# Patient Record
Sex: FEMALE | Race: WHITE | Employment: OTHER | URBAN - METROPOLITAN AREA
[De-identification: names, ages, dates, MRNs, and addresses within clinical notes are randomized per-mention and may not be internally consistent; named-entity substitution may affect disease eponyms.]

---

## 2017-01-10 RX ORDER — SIMVASTATIN 10 MG
TABLET ORAL
Qty: 90 TABLET | Refills: 0 | Status: SHIPPED | OUTPATIENT
Start: 2017-01-10 | End: 2017-04-14

## 2017-02-20 ENCOUNTER — OFFICE VISIT (OUTPATIENT)
Dept: FAMILY MEDICINE CLINIC | Facility: CLINIC | Age: 57
End: 2017-02-20

## 2017-02-20 VITALS
TEMPERATURE: 98 F | RESPIRATION RATE: 17 BRPM | HEART RATE: 56 BPM | DIASTOLIC BLOOD PRESSURE: 69 MMHG | WEIGHT: 113.38 LBS | HEIGHT: 63.39 IN | BODY MASS INDEX: 19.84 KG/M2 | SYSTOLIC BLOOD PRESSURE: 105 MMHG

## 2017-02-20 DIAGNOSIS — Z00.00 ROUTINE PHYSICAL EXAMINATION: Primary | ICD-10-CM

## 2017-02-20 DIAGNOSIS — M85.80 OSTEOPENIA, UNSPECIFIED LOCATION: ICD-10-CM

## 2017-02-20 DIAGNOSIS — E78.5 HYPERLIPIDEMIA, UNSPECIFIED HYPERLIPIDEMIA TYPE: ICD-10-CM

## 2017-02-20 DIAGNOSIS — Z12.31 ENCOUNTER FOR SCREENING MAMMOGRAM FOR BREAST CANCER: ICD-10-CM

## 2017-02-20 PROCEDURE — 99396 PREV VISIT EST AGE 40-64: CPT | Performed by: FAMILY MEDICINE

## 2017-02-20 NOTE — PROGRESS NOTES
HPI:    Patient ID: Sergey Bryant is a 60year old female. HPI    Review of Systems   Constitutional: Negative. Respiratory: Negative. Cardiovascular: Negative. Gastrointestinal: Negative. Skin: Negative. Neurological: Negative. requested or ordered in this encounter    Imaging & Referrals:  None       #[id number]

## 2017-02-21 ENCOUNTER — APPOINTMENT (OUTPATIENT)
Dept: LAB | Age: 57
End: 2017-02-21
Attending: FAMILY MEDICINE
Payer: COMMERCIAL

## 2017-02-21 DIAGNOSIS — Z00.00 ROUTINE PHYSICAL EXAMINATION: ICD-10-CM

## 2017-02-21 DIAGNOSIS — M85.80 OSTEOPENIA, UNSPECIFIED LOCATION: ICD-10-CM

## 2017-02-21 DIAGNOSIS — E78.5 HYPERLIPIDEMIA, UNSPECIFIED HYPERLIPIDEMIA TYPE: ICD-10-CM

## 2017-02-21 LAB
ALBUMIN SERPL BCP-MCNC: 4.3 G/DL (ref 3.5–4.8)
ALBUMIN/GLOB SERPL: 1.8 {RATIO} (ref 1–2)
ALP SERPL-CCNC: 43 U/L (ref 32–100)
ALT SERPL-CCNC: 49 U/L (ref 14–54)
ANION GAP SERPL CALC-SCNC: 7 MMOL/L (ref 0–18)
AST SERPL-CCNC: 30 U/L (ref 15–41)
BILIRUB SERPL-MCNC: 0.8 MG/DL (ref 0.3–1.2)
BUN SERPL-MCNC: 11 MG/DL (ref 8–20)
BUN/CREAT SERPL: 18.6 (ref 10–20)
CALCIUM SERPL-MCNC: 9.5 MG/DL (ref 8.5–10.5)
CHLORIDE SERPL-SCNC: 105 MMOL/L (ref 95–110)
CHOLEST SERPL-MCNC: 162 MG/DL (ref 110–200)
CO2 SERPL-SCNC: 28 MMOL/L (ref 22–32)
CREAT SERPL-MCNC: 0.59 MG/DL (ref 0.5–1.5)
GLOBULIN PLAS-MCNC: 2.4 G/DL (ref 2.5–3.7)
GLUCOSE SERPL-MCNC: 84 MG/DL (ref 70–99)
HDLC SERPL-MCNC: 54 MG/DL
LDLC SERPL CALC-MCNC: 83 MG/DL (ref 0–99)
NONHDLC SERPL-MCNC: 108 MG/DL
OSMOLALITY UR CALC.SUM OF ELEC: 289 MOSM/KG (ref 275–295)
POTASSIUM SERPL-SCNC: 4.1 MMOL/L (ref 3.3–5.1)
PROT SERPL-MCNC: 6.7 G/DL (ref 5.9–8.4)
SODIUM SERPL-SCNC: 140 MMOL/L (ref 136–144)
TRIGL SERPL-MCNC: 123 MG/DL (ref 1–149)

## 2017-02-21 PROCEDURE — 82306 VITAMIN D 25 HYDROXY: CPT

## 2017-02-21 PROCEDURE — 80061 LIPID PANEL: CPT

## 2017-02-21 PROCEDURE — 80053 COMPREHEN METABOLIC PANEL: CPT

## 2017-02-22 LAB — 25(OH)D3 SERPL-MCNC: 42.6 NG/ML

## 2017-03-30 ENCOUNTER — OFFICE VISIT (OUTPATIENT)
Dept: OBGYN CLINIC | Facility: CLINIC | Age: 57
End: 2017-03-30

## 2017-03-30 VITALS
DIASTOLIC BLOOD PRESSURE: 64 MMHG | HEIGHT: 63 IN | WEIGHT: 114 LBS | SYSTOLIC BLOOD PRESSURE: 116 MMHG | BODY MASS INDEX: 20.2 KG/M2

## 2017-03-30 DIAGNOSIS — Z01.419 ENCOUNTER FOR GYNECOLOGICAL EXAMINATION WITHOUT ABNORMAL FINDING: Primary | ICD-10-CM

## 2017-03-30 PROCEDURE — 87624 HPV HI-RISK TYP POOLED RSLT: CPT | Performed by: OBSTETRICS & GYNECOLOGY

## 2017-03-30 PROCEDURE — 99396 PREV VISIT EST AGE 40-64: CPT | Performed by: OBSTETRICS & GYNECOLOGY

## 2017-03-30 PROCEDURE — 88175 CYTOPATH C/V AUTO FLUID REDO: CPT | Performed by: OBSTETRICS & GYNECOLOGY

## 2017-03-30 NOTE — PROGRESS NOTES
Lillian Vazquez is here for a checkup. I have not seen her since 2014. She has no complaints she has not had any vaginal bleeding or spotting at all. She is up-to-date with her mammograms and colonoscopy.     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  - ThinPrep PAP Smear  - Hpv Dna  High Risk , Osmin Corbin MD  1:44 PM  3/30/2017

## 2017-03-31 LAB — HPV I/H RISK 1 DNA SPEC QL NAA+PROBE: NEGATIVE

## 2017-04-14 RX ORDER — SIMVASTATIN 10 MG
TABLET ORAL
Qty: 90 TABLET | Refills: 0 | Status: SHIPPED | OUTPATIENT
Start: 2017-04-14 | End: 2017-07-19

## 2017-04-14 NOTE — TELEPHONE ENCOUNTER
Rx request for Simvastatin 10 mg, PASSED Cholesterol Protocol. Rx filled per protocol.     Cholesterol Medications  Protocol Criteria:  · Appointment scheduled in the past 12 months or in the next 3 months  · ALT & LDL on file in the past 12 months  · ALT r

## 2017-05-08 ENCOUNTER — HOSPITAL ENCOUNTER (OUTPATIENT)
Dept: MAMMOGRAPHY | Facility: HOSPITAL | Age: 57
Discharge: HOME | End: 2017-05-08
Attending: FAMILY MEDICINE
Payer: COMMERCIAL

## 2017-05-08 DIAGNOSIS — Z12.31 ENCOUNTER FOR SCREENING MAMMOGRAM FOR BREAST CANCER: ICD-10-CM

## 2017-05-08 PROCEDURE — 77067 SCR MAMMO BI INCL CAD: CPT | Performed by: FAMILY MEDICINE

## 2017-07-19 ENCOUNTER — TELEPHONE (OUTPATIENT)
Dept: OBGYN CLINIC | Facility: CLINIC | Age: 57
End: 2017-07-19

## 2017-07-19 ENCOUNTER — OFFICE VISIT (OUTPATIENT)
Dept: OBGYN CLINIC | Facility: CLINIC | Age: 57
End: 2017-07-19

## 2017-07-19 VITALS — SYSTOLIC BLOOD PRESSURE: 122 MMHG | DIASTOLIC BLOOD PRESSURE: 74 MMHG

## 2017-07-19 DIAGNOSIS — N76.0 VAGINITIS AND VULVOVAGINITIS: Primary | ICD-10-CM

## 2017-07-19 PROCEDURE — 87808 TRICHOMONAS ASSAY W/OPTIC: CPT | Performed by: OBSTETRICS & GYNECOLOGY

## 2017-07-19 PROCEDURE — 99213 OFFICE O/P EST LOW 20 MIN: CPT | Performed by: OBSTETRICS & GYNECOLOGY

## 2017-07-19 PROCEDURE — 87205 SMEAR GRAM STAIN: CPT | Performed by: OBSTETRICS & GYNECOLOGY

## 2017-07-19 PROCEDURE — 87106 FUNGI IDENTIFICATION YEAST: CPT | Performed by: OBSTETRICS & GYNECOLOGY

## 2017-07-19 RX ORDER — METRONIDAZOLE 7.5 MG/G
GEL VAGINAL 2 TIMES DAILY
Status: SHIPPED | OUTPATIENT
Start: 2017-07-19 | End: 2017-07-24

## 2017-07-19 RX ORDER — SIMVASTATIN 10 MG
TABLET ORAL
Qty: 90 TABLET | Refills: 3 | Status: SHIPPED | OUTPATIENT
Start: 2017-07-19 | End: 2018-07-16

## 2017-07-19 NOTE — PROGRESS NOTES
Arturo here for a checkup. She is complaining of vaginal irritation and vaginal discharge. Patient was swimming in the leg for past couple weeks. She denies any vaginal spotting or bleeding. She denies any abdominal pain, fever, chills.     On examinatio

## 2017-07-19 NOTE — TELEPHONE ENCOUNTER
Pt at pharmacy, prescription not sent, per pharmacist. Prescription for Metrogel called in to [facility].

## 2017-07-21 LAB
CANDIDA SCREEN: NEGATIVE
GENITAL VAGINOSIS SCREEN: NEGATIVE
TRICHOMONAS SCREEN: NEGATIVE

## 2017-11-07 ENCOUNTER — TELEPHONE (OUTPATIENT)
Dept: OBGYN CLINIC | Facility: CLINIC | Age: 57
End: 2017-11-07

## 2017-11-07 RX ORDER — METRONIDAZOLE 7.5 MG/G
GEL VAGINAL
Qty: 1 TUBE | Refills: 0 | Status: SHIPPED | OUTPATIENT
Start: 2017-11-07 | End: 2017-11-21 | Stop reason: ALTCHOICE

## 2017-11-07 NOTE — TELEPHONE ENCOUNTER
Pt states she has BV. She she was treated back in July by Dr. Lillian Triana and is currently out of town and wont be back in town till November 18 th can something be called in for her. pharmacy # [phone] Kacey Archuleta)

## 2017-11-07 NOTE — TELEPHONE ENCOUNTER
Spoke to pt who stated that she is having sx of vaginosis. She has a yellow d/c with odor. She saw Dr. Servando Becerra in July for the same thing. Per Dr. Servando Becerra, ok to give pt Metrogel bid x 5 days. Sent script to [facility] in [place]. Let pt know.

## 2017-11-21 ENCOUNTER — OFFICE VISIT (OUTPATIENT)
Dept: FAMILY MEDICINE CLINIC | Facility: CLINIC | Age: 57
End: 2017-11-21

## 2017-11-21 VITALS
HEART RATE: 118 BPM | SYSTOLIC BLOOD PRESSURE: 109 MMHG | BODY MASS INDEX: 20.2 KG/M2 | TEMPERATURE: 98 F | HEIGHT: 63 IN | WEIGHT: 114 LBS | DIASTOLIC BLOOD PRESSURE: 70 MMHG

## 2017-11-21 DIAGNOSIS — Z48.02 ENCOUNTER FOR REMOVAL OF SUTURES: Primary | ICD-10-CM

## 2017-11-21 PROCEDURE — 99212 OFFICE O/P EST SF 10 MIN: CPT | Performed by: PHYSICIAN ASSISTANT

## 2017-11-21 NOTE — PROGRESS NOTES
HPI:    Patient ID: Stewart Milner is a 60year old female. Patient presents for removal of 3 simple sutures placed to bottom of left foot at immediate care in [place] one week ago.   She states some time before that she may have sustained small piece of gla very well. Patient advised to follow up if she has any persisting pain in area or develops redness or swelling. No orders of the defined types were placed in this encounter.       Meds This Visit:  No prescriptions requested or ordered in this encount

## 2017-12-22 ENCOUNTER — HOSPITAL ENCOUNTER (OUTPATIENT)
Age: 57
Discharge: HOME | End: 2017-12-22
Attending: FAMILY MEDICINE
Payer: COMMERCIAL

## 2017-12-22 ENCOUNTER — TELEPHONE (OUTPATIENT)
Dept: FAMILY MEDICINE CLINIC | Facility: CLINIC | Age: 57
End: 2017-12-22

## 2017-12-22 VITALS
RESPIRATION RATE: 20 BRPM | HEART RATE: 66 BPM | SYSTOLIC BLOOD PRESSURE: 132 MMHG | OXYGEN SATURATION: 99 % | DIASTOLIC BLOOD PRESSURE: 65 MMHG | TEMPERATURE: 98 F

## 2017-12-22 DIAGNOSIS — L24.9 IRRITANT CONTACT DERMATITIS, UNSPECIFIED TRIGGER: Primary | ICD-10-CM

## 2017-12-22 PROCEDURE — 99202 OFFICE O/P NEW SF 15 MIN: CPT

## 2017-12-22 PROCEDURE — 99212 OFFICE O/P EST SF 10 MIN: CPT

## 2017-12-22 RX ORDER — DIAPER,BRIEF,INFANT-TODD,DISP
EACH MISCELLANEOUS
Qty: 14 G | Refills: 0 | Status: SHIPPED | OUTPATIENT
Start: 2017-12-22 | End: 2018-01-23

## 2017-12-22 NOTE — TELEPHONE ENCOUNTER
Patient requesting to speak with Dr. Luly Mixon due to thinks has possible shingles and daugher 9 months pregnant so does not want to give it to her. Patient states has rash, with  No pain at this time but has been waking daily with headache.

## 2017-12-22 NOTE — ED INITIAL ASSESSMENT (HPI)
Pt here with a rash adryan to the right side of her chest that she noticed about a week, pt denies any pain to the area , pt complaints of a headache that has been going on for about a week, pt denies any fevers

## 2017-12-22 NOTE — TELEPHONE ENCOUNTER
Dr Jeovany Ann is not in the office, please have her go to urgent care or send this triage for further management

## 2017-12-22 NOTE — ED NOTES
Pt discharged home pt instructed to take hydrocortisone over the counter , pt instructed to follow up with primary md if symptoms worsen

## 2017-12-22 NOTE — TELEPHONE ENCOUNTER
Advised pt of Dr Tamiko Tatum advice, Patient verbalized understanding and had no further questions.

## 2017-12-22 NOTE — ED PROVIDER NOTES
Patient Seen in: [address]    History   Patient presents with:  Rash Skin Problem (integumentary)    Stated Complaint: possible shingles    HPI    Patient here with a small rash over the anterior chest just under the le dermatitis noted but the size of a dime. No erythema. No induration. No fluctuation. No vesicles. Dermatitis appears to be at the bra line.   HEENT examination normal  Heart sounds normal.  Rate regular rhythm  Lungs clear to auscultation bilaterally

## 2018-01-23 ENCOUNTER — OFFICE VISIT (OUTPATIENT)
Dept: FAMILY MEDICINE CLINIC | Facility: CLINIC | Age: 58
End: 2018-01-23

## 2018-01-23 VITALS
WEIGHT: 112 LBS | RESPIRATION RATE: 16 BRPM | SYSTOLIC BLOOD PRESSURE: 121 MMHG | BODY MASS INDEX: 20 KG/M2 | HEART RATE: 57 BPM | DIASTOLIC BLOOD PRESSURE: 70 MMHG | TEMPERATURE: 98 F

## 2018-01-23 DIAGNOSIS — L84 CORN OF FOOT: ICD-10-CM

## 2018-01-23 DIAGNOSIS — M67.442 GANGLION CYST OF FINGER OF LEFT HAND: Primary | ICD-10-CM

## 2018-01-23 PROCEDURE — 99213 OFFICE O/P EST LOW 20 MIN: CPT | Performed by: FAMILY MEDICINE

## 2018-01-23 PROCEDURE — 99212 OFFICE O/P EST SF 10 MIN: CPT | Performed by: FAMILY MEDICINE

## 2018-01-23 NOTE — PROGRESS NOTES
HPI:    Patient ID: Travis Moore is a 59year old female. See below      Finger Pain    The pain is present in the left hand. This is a new problem. The current episode started more than 1 month ago. The problem occurs intermittently.  The problem has been

## 2018-03-08 ENCOUNTER — OFFICE VISIT (OUTPATIENT)
Dept: FAMILY MEDICINE CLINIC | Facility: CLINIC | Age: 58
End: 2018-03-08

## 2018-03-08 ENCOUNTER — APPOINTMENT (OUTPATIENT)
Dept: LAB | Age: 58
End: 2018-03-08
Attending: FAMILY MEDICINE
Payer: COMMERCIAL

## 2018-03-08 VITALS
HEART RATE: 59 BPM | HEIGHT: 63 IN | RESPIRATION RATE: 17 BRPM | TEMPERATURE: 98 F | SYSTOLIC BLOOD PRESSURE: 109 MMHG | DIASTOLIC BLOOD PRESSURE: 71 MMHG | WEIGHT: 114.38 LBS | BODY MASS INDEX: 20.27 KG/M2

## 2018-03-08 DIAGNOSIS — E78.5 HYPERLIPIDEMIA, UNSPECIFIED HYPERLIPIDEMIA TYPE: ICD-10-CM

## 2018-03-08 DIAGNOSIS — Z00.00 ROUTINE PHYSICAL EXAMINATION: ICD-10-CM

## 2018-03-08 DIAGNOSIS — M85.80 OSTEOPENIA, UNSPECIFIED LOCATION: ICD-10-CM

## 2018-03-08 DIAGNOSIS — Z12.31 ENCOUNTER FOR SCREENING MAMMOGRAM FOR BREAST CANCER: ICD-10-CM

## 2018-03-08 DIAGNOSIS — R92.2 DENSE BREASTS: ICD-10-CM

## 2018-03-08 DIAGNOSIS — Z86.010 HISTORY OF COLONIC POLYPS: ICD-10-CM

## 2018-03-08 DIAGNOSIS — Z00.00 ROUTINE PHYSICAL EXAMINATION: Primary | ICD-10-CM

## 2018-03-08 PROBLEM — R92.30 DENSE BREASTS: Status: ACTIVE | Noted: 2018-03-08

## 2018-03-08 LAB
ALBUMIN SERPL BCP-MCNC: 4.5 G/DL (ref 3.5–4.8)
ALBUMIN/GLOB SERPL: 1.8 {RATIO} (ref 1–2)
ALP SERPL-CCNC: 44 U/L (ref 32–100)
ALT SERPL-CCNC: 29 U/L (ref 14–54)
ANION GAP SERPL CALC-SCNC: 11 MMOL/L (ref 0–18)
AST SERPL-CCNC: 28 U/L (ref 15–41)
BILIRUB SERPL-MCNC: 0.5 MG/DL (ref 0.3–1.2)
BUN SERPL-MCNC: 14 MG/DL (ref 8–20)
BUN/CREAT SERPL: 21.9 (ref 10–20)
CALCIUM SERPL-MCNC: 9.3 MG/DL (ref 8.5–10.5)
CHLORIDE SERPL-SCNC: 100 MMOL/L (ref 95–110)
CHOLEST SERPL-MCNC: 228 MG/DL (ref 110–200)
CO2 SERPL-SCNC: 27 MMOL/L (ref 22–32)
CREAT SERPL-MCNC: 0.64 MG/DL (ref 0.5–1.5)
GLOBULIN PLAS-MCNC: 2.5 G/DL (ref 2.5–3.7)
GLUCOSE SERPL-MCNC: 84 MG/DL (ref 70–99)
HDLC SERPL-MCNC: 76 MG/DL
LDLC SERPL CALC-MCNC: 109 MG/DL (ref 0–99)
NONHDLC SERPL-MCNC: 152 MG/DL
OSMOLALITY UR CALC.SUM OF ELEC: 286 MOSM/KG (ref 275–295)
PATIENT FASTING: NO
POTASSIUM SERPL-SCNC: 3.5 MMOL/L (ref 3.3–5.1)
PROT SERPL-MCNC: 7 G/DL (ref 5.9–8.4)
SODIUM SERPL-SCNC: 138 MMOL/L (ref 136–144)
TRIGL SERPL-MCNC: 214 MG/DL (ref 1–149)

## 2018-03-08 PROCEDURE — 86803 HEPATITIS C AB TEST: CPT

## 2018-03-08 PROCEDURE — 36415 COLL VENOUS BLD VENIPUNCTURE: CPT

## 2018-03-08 PROCEDURE — 99396 PREV VISIT EST AGE 40-64: CPT | Performed by: FAMILY MEDICINE

## 2018-03-08 PROCEDURE — 80061 LIPID PANEL: CPT

## 2018-03-08 PROCEDURE — 80053 COMPREHEN METABOLIC PANEL: CPT

## 2018-03-08 NOTE — PROGRESS NOTES
HPI:    Patient ID: Genoveva Matthews is a 59year old female. HPI    Review of Systems   Constitutional: Negative. Respiratory: Negative. Cardiovascular: Negative. Gastrointestinal: Negative. Skin: Negative. Neurological: Negative. [E]      Lipid Panel [E]      HCV Antibody [E]    Meds This Visit:  No prescriptions requested or ordered in this encounter    Imaging & Referrals:  [facility] ASHLY 2D+3D SCREENING BILAT (CPT=[id number])       XO#[id number]

## 2018-03-09 LAB — HCV AB SERPL QL IA: NONREACTIVE

## 2018-05-21 ENCOUNTER — HOSPITAL ENCOUNTER (OUTPATIENT)
Dept: MAMMOGRAPHY | Age: 58
Discharge: HOME | End: 2018-05-21
Attending: FAMILY MEDICINE
Payer: COMMERCIAL

## 2018-05-21 DIAGNOSIS — Z12.31 ENCOUNTER FOR SCREENING MAMMOGRAM FOR BREAST CANCER: ICD-10-CM

## 2018-05-21 DIAGNOSIS — R92.2 DENSE BREASTS: ICD-10-CM

## 2018-05-21 PROCEDURE — 77063 BREAST TOMOSYNTHESIS BI: CPT | Performed by: FAMILY MEDICINE

## 2018-05-21 PROCEDURE — 77067 SCR MAMMO BI INCL CAD: CPT | Performed by: FAMILY MEDICINE

## 2018-05-22 ENCOUNTER — TELEPHONE (OUTPATIENT)
Dept: OTHER | Age: 58
End: 2018-05-22

## 2018-05-22 NOTE — TELEPHONE ENCOUNTER
I received a call form pt she stated that she received a letter from the radiologist through her [employer] account in regards to a mammogram she had done yesterday that she needed to contact you for her test results. Please Advise.       RECOMMENDAT

## 2018-05-22 NOTE — TELEPHONE ENCOUNTER
Please let her know radiologist described area of \"asymmetry\". This is nonspecific, NOT highly suspicious. However, I definitely recommend additional views and possible ultrasound as recommended. This will give her information.

## 2018-05-22 NOTE — TELEPHONE ENCOUNTER
Advised patient of Dr. Salo Lackey note. Patient verbalized understanding  Patient given number for central scheduling to schedule right breast ultrasound.

## 2018-05-31 ENCOUNTER — HOSPITAL ENCOUNTER (OUTPATIENT)
Dept: MAMMOGRAPHY | Facility: HOSPITAL | Age: 58
Discharge: HOME | End: 2018-05-31
Attending: FAMILY MEDICINE
Payer: COMMERCIAL

## 2018-05-31 DIAGNOSIS — R92.8 ABNORMAL MAMMOGRAM: ICD-10-CM

## 2018-05-31 PROCEDURE — 77065 DX MAMMO INCL CAD UNI: CPT | Performed by: FAMILY MEDICINE

## 2018-05-31 PROCEDURE — 77061 BREAST TOMOSYNTHESIS UNI: CPT | Performed by: FAMILY MEDICINE

## 2018-07-16 RX ORDER — SIMVASTATIN 10 MG
TABLET ORAL
Qty: 90 TABLET | Refills: 0 | Status: SHIPPED | OUTPATIENT
Start: 2018-07-16 | End: 2018-10-24

## 2018-10-25 RX ORDER — SIMVASTATIN 10 MG
TABLET ORAL
Qty: 90 TABLET | Refills: 0 | Status: SHIPPED | OUTPATIENT
Start: 2018-10-25 | End: 2019-01-26

## 2018-10-26 NOTE — TELEPHONE ENCOUNTER
Cholesterol Medications  Protocol Criteria:  · Appointment scheduled in the past 12 months or in the next 3 months  · ALT & LDL on file in the past 12 months  · ALT result < 80  · LDL result <130   Recent Outpatient Visits            7 months ago Routine p

## 2018-12-18 ENCOUNTER — TELEPHONE (OUTPATIENT)
Dept: OTHER | Age: 58
End: 2018-12-18

## 2018-12-18 NOTE — TELEPHONE ENCOUNTER
Called pt to let her know that I removed her already from [employer] recall but not to appt recall and pt understood.

## 2018-12-18 NOTE — TELEPHONE ENCOUNTER
Spoke with patient and states, \"I keep getting robo-calls telling me to schedule a physical with Dr. Sonia Alonzo. I saw her in March--I don't remember her saying I need to come back any sooner. Can you ask her why I keep getting these calls.  I just got one 10

## 2018-12-18 NOTE — TELEPHONE ENCOUNTER
My note says to return in 1 year. I do not know why she is getting calls. Please be sure she is taken off automated calls.

## 2019-01-09 ENCOUNTER — OFFICE VISIT (OUTPATIENT)
Dept: OBGYN CLINIC | Facility: CLINIC | Age: 59
End: 2019-01-09
Payer: COMMERCIAL

## 2019-01-09 VITALS
BODY MASS INDEX: 19.84 KG/M2 | SYSTOLIC BLOOD PRESSURE: 110 MMHG | DIASTOLIC BLOOD PRESSURE: 64 MMHG | HEIGHT: 63 IN | WEIGHT: 112 LBS

## 2019-01-09 DIAGNOSIS — M85.80 OSTEOPENIA AFTER MENOPAUSE: ICD-10-CM

## 2019-01-09 DIAGNOSIS — Z78.0 OSTEOPENIA AFTER MENOPAUSE: ICD-10-CM

## 2019-01-09 DIAGNOSIS — Z01.419 WOMEN'S ANNUAL ROUTINE GYNECOLOGICAL EXAMINATION: Primary | ICD-10-CM

## 2019-01-09 PROCEDURE — 87624 HPV HI-RISK TYP POOLED RSLT: CPT | Performed by: OBSTETRICS & GYNECOLOGY

## 2019-01-09 PROCEDURE — 99396 PREV VISIT EST AGE 40-64: CPT | Performed by: OBSTETRICS & GYNECOLOGY

## 2019-01-09 NOTE — PROGRESS NOTES
Carolina Glover for a checkup. She has no gynecologic complaints. Her mammogram May of last year was normal patient is up-to-date with her colonoscopy.   She has history of osteopenia in the past so I have recommended that that she gets bone density in May along wi Bactrim [Sulfametho*      Sulfanilamide           RASH    Medications       Current Outpatient Medications:  SIMVASTATIN 10 MG Oral Tab TAKE 1 TABLET BY MOUTH EVERY EVENING Disp: 90 tablet Rfl: 0   Calcium Carbonate-Vitamin D (CALCIUM 500/D) 500-200 History Narrative      Not on file      Exam     /64   Ht 63\"   Wt 112 lb   LMP  (Exact Date)   BMI 19.84 kg/m²     GENERAL: well developed, well nourished, in no apparent distress  SKIN: no rashes, no suspicious lesions  HEENT: normal  NECK: supple

## 2019-01-10 LAB — HPV I/H RISK 1 DNA SPEC QL NAA+PROBE: NEGATIVE

## 2019-01-26 RX ORDER — SIMVASTATIN 10 MG
TABLET ORAL
Qty: 90 TABLET | Refills: 0 | Status: SHIPPED | OUTPATIENT
Start: 2019-01-26 | End: 2019-05-04

## 2019-05-05 RX ORDER — SIMVASTATIN 10 MG
TABLET ORAL
Qty: 90 TABLET | Refills: 3 | Status: SHIPPED | OUTPATIENT
Start: 2019-05-05 | End: 2020-05-05

## 2019-05-08 ENCOUNTER — OFFICE VISIT (OUTPATIENT)
Dept: FAMILY MEDICINE CLINIC | Facility: CLINIC | Age: 59
End: 2019-05-08
Payer: COMMERCIAL

## 2019-05-08 ENCOUNTER — APPOINTMENT (OUTPATIENT)
Dept: LAB | Age: 59
End: 2019-05-08
Attending: FAMILY MEDICINE
Payer: COMMERCIAL

## 2019-05-08 VITALS
HEIGHT: 63.5 IN | HEART RATE: 57 BPM | TEMPERATURE: 98 F | SYSTOLIC BLOOD PRESSURE: 110 MMHG | DIASTOLIC BLOOD PRESSURE: 70 MMHG | RESPIRATION RATE: 18 BRPM | WEIGHT: 111.19 LBS | BODY MASS INDEX: 19.46 KG/M2

## 2019-05-08 DIAGNOSIS — Z00.00 ROUTINE PHYSICAL EXAMINATION: Primary | ICD-10-CM

## 2019-05-08 DIAGNOSIS — E78.5 HYPERLIPIDEMIA, UNSPECIFIED HYPERLIPIDEMIA TYPE: ICD-10-CM

## 2019-05-08 DIAGNOSIS — Z00.00 ROUTINE PHYSICAL EXAMINATION: ICD-10-CM

## 2019-05-08 DIAGNOSIS — M85.80 OSTEOPENIA, UNSPECIFIED LOCATION: ICD-10-CM

## 2019-05-08 PROCEDURE — 80053 COMPREHEN METABOLIC PANEL: CPT

## 2019-05-08 PROCEDURE — 99396 PREV VISIT EST AGE 40-64: CPT | Performed by: FAMILY MEDICINE

## 2019-05-08 PROCEDURE — 80061 LIPID PANEL: CPT

## 2019-05-08 PROCEDURE — 36415 COLL VENOUS BLD VENIPUNCTURE: CPT

## 2019-05-08 PROCEDURE — 82306 VITAMIN D 25 HYDROXY: CPT

## 2019-05-08 NOTE — PROGRESS NOTES
HPI:    Patient ID: Gayla Crespo is a 59year old female. HPI    Review of Systems   Constitutional: Negative. Respiratory: Negative. Cardiovascular: Negative. Gastrointestinal: Negative. Skin: Negative. Neurological: Negative. Panel (14) [E]      Vitamin D, 25-Hydroxy [E]      Meds This Visit:  Requested Prescriptions      No prescriptions requested or ordered in this encounter       Imaging & Referrals:  None       #[id number]

## 2019-06-03 ENCOUNTER — NURSE TRIAGE (OUTPATIENT)
Dept: OTHER | Age: 59
End: 2019-06-03

## 2019-06-03 RX ORDER — PERMETHRIN 50 MG/G
1 CREAM TOPICAL ONCE
Qty: 60 G | Refills: 1 | Status: SHIPPED | OUTPATIENT
Start: 2019-06-03 | End: 2019-06-03

## 2019-06-03 NOTE — TELEPHONE ENCOUNTER
Action Requested: Summary for Provider     []  Critical Lab, Recommendations Needed  [] Need Additional Advice  []   FYI    []   Need Orders  [x] Need Medications Sent to Pharmacy  []  Other     SUMMARY: Dr. Giovanna Smith pt stated that this morning she notice th

## 2019-06-11 ENCOUNTER — HOSPITAL ENCOUNTER (OUTPATIENT)
Dept: BONE DENSITY | Facility: HOSPITAL | Age: 59
Discharge: HOME | End: 2019-06-11
Attending: OBSTETRICS & GYNECOLOGY
Payer: COMMERCIAL

## 2019-06-11 ENCOUNTER — HOSPITAL ENCOUNTER (OUTPATIENT)
Dept: MAMMOGRAPHY | Facility: HOSPITAL | Age: 59
Discharge: HOME | End: 2019-06-11
Attending: OBSTETRICS & GYNECOLOGY
Payer: COMMERCIAL

## 2019-06-11 DIAGNOSIS — Z01.419 WOMEN'S ANNUAL ROUTINE GYNECOLOGICAL EXAMINATION: ICD-10-CM

## 2019-06-11 DIAGNOSIS — M85.80 OSTEOPENIA AFTER MENOPAUSE: ICD-10-CM

## 2019-06-11 DIAGNOSIS — Z78.0 OSTEOPENIA AFTER MENOPAUSE: ICD-10-CM

## 2019-06-11 PROCEDURE — 77063 BREAST TOMOSYNTHESIS BI: CPT | Performed by: OBSTETRICS & GYNECOLOGY

## 2019-06-11 PROCEDURE — 77080 DXA BONE DENSITY AXIAL: CPT | Performed by: OBSTETRICS & GYNECOLOGY

## 2019-06-11 PROCEDURE — 77067 SCR MAMMO BI INCL CAD: CPT | Performed by: OBSTETRICS & GYNECOLOGY

## 2020-03-10 ENCOUNTER — OFFICE VISIT (OUTPATIENT)
Dept: OBGYN CLINIC | Facility: CLINIC | Age: 60
End: 2020-03-10
Payer: COMMERCIAL

## 2020-03-10 VITALS
DIASTOLIC BLOOD PRESSURE: 62 MMHG | WEIGHT: 113 LBS | HEIGHT: 63.5 IN | BODY MASS INDEX: 19.78 KG/M2 | SYSTOLIC BLOOD PRESSURE: 112 MMHG

## 2020-03-10 DIAGNOSIS — Z01.419 WOMEN'S ANNUAL ROUTINE GYNECOLOGICAL EXAMINATION: Primary | ICD-10-CM

## 2020-03-10 PROCEDURE — 99396 PREV VISIT EST AGE 40-64: CPT | Performed by: OBSTETRICS & GYNECOLOGY

## 2020-03-10 PROCEDURE — 87624 HPV HI-RISK TYP POOLED RSLT: CPT | Performed by: OBSTETRICS & GYNECOLOGY

## 2020-03-10 NOTE — PROGRESS NOTES
Bluford Duane is here for a checkup. She has no gynecologic complaints. Her mammogram June of last year was normal I gave her a prescription for mammogram for June of this year. She is current with her colonoscopy.     On examination:  [facility] • Calcium Carbonate-Vitamin D (CALCIUM 500/D) 500-200 MG-UNIT Oral Tab Take 1 tablet by mouth daily.          Family History     Family History   Problem Relation Age of Onset   • Hypertension Father         HTN   • Heart Disease Father         CAD   • Yany Ruiz Not Asked        Caffeine Concern: Yes          tea, 1 cup daily        Occupational Exposure: Not Asked        Hobby Hazards: Not Asked        Sleep Concern: Not Asked        Stress Concern: Not Asked        Weight Concern: Not Asked        Special Diet: HPV HIGH RISK , THIN PREP COLLECTION      Sol Buckley MD  12:33 PM  3/10/2020

## 2020-03-11 LAB — HPV I/H RISK 1 DNA SPEC QL NAA+PROBE: NEGATIVE

## 2020-05-05 RX ORDER — SIMVASTATIN 10 MG
TABLET ORAL
Qty: 90 TABLET | Refills: 3 | Status: SHIPPED | OUTPATIENT
Start: 2020-05-05 | End: 2021-04-29

## 2020-06-01 ENCOUNTER — OFFICE VISIT (OUTPATIENT)
Dept: FAMILY MEDICINE CLINIC | Facility: CLINIC | Age: 60
End: 2020-06-01
Payer: COMMERCIAL

## 2020-06-01 VITALS
BODY MASS INDEX: 19 KG/M2 | DIASTOLIC BLOOD PRESSURE: 75 MMHG | HEART RATE: 60 BPM | WEIGHT: 111.63 LBS | SYSTOLIC BLOOD PRESSURE: 121 MMHG | TEMPERATURE: 98 F

## 2020-06-01 DIAGNOSIS — M85.80 OSTEOPENIA, UNSPECIFIED LOCATION: ICD-10-CM

## 2020-06-01 DIAGNOSIS — Z00.00 ROUTINE PHYSICAL EXAMINATION: Primary | ICD-10-CM

## 2020-06-01 DIAGNOSIS — E78.5 HYPERLIPIDEMIA, UNSPECIFIED HYPERLIPIDEMIA TYPE: ICD-10-CM

## 2020-06-01 DIAGNOSIS — Z85.828 HISTORY OF BASAL CELL CANCER: ICD-10-CM

## 2020-06-01 DIAGNOSIS — Z12.11 COLON CANCER SCREENING: ICD-10-CM

## 2020-06-01 DIAGNOSIS — Z86.010 HISTORY OF COLONIC POLYPS: ICD-10-CM

## 2020-06-01 PROCEDURE — 99396 PREV VISIT EST AGE 40-64: CPT | Performed by: FAMILY MEDICINE

## 2020-06-01 RX ORDER — CHOLECALCIFEROL (VITAMIN D3) 125 MCG
CAPSULE ORAL
COMMUNITY
Start: 2018-01-03

## 2020-06-01 NOTE — PROGRESS NOTES
HPI:    Patient ID: Shimon Paez is a 62year old female. HPI    Review of Systems   Constitutional: Negative. Respiratory: Negative. Cardiovascular: Negative. Gastrointestinal: Negative. Skin: Negative. Neurological: Negative. diet, weightbearing exercise.     History of colonic polyps–as above    Orders Placed This Encounter      Comp Metabolic Panel (14) [E]      Lipid Panel [E]      Zoster Recombinant Adjuvanted [Shingrix -Shingles] (58155)      Meds This Visit:  Requested Pre

## 2020-06-04 ENCOUNTER — TELEPHONE (OUTPATIENT)
Dept: OBGYN CLINIC | Facility: CLINIC | Age: 60
End: 2020-06-04

## 2020-06-04 DIAGNOSIS — Z12.31 VISIT FOR SCREENING MAMMOGRAM: Primary | ICD-10-CM

## 2020-06-04 NOTE — TELEPHONE ENCOUNTER
Needs to speak nurse about coding error on maamagram      Diagnoses says:     Women's annual routine gynecological examination

## 2020-06-23 ENCOUNTER — HOSPITAL ENCOUNTER (OUTPATIENT)
Dept: MAMMOGRAPHY | Age: 60
Discharge: HOME | End: 2020-06-23
Attending: OBSTETRICS & GYNECOLOGY
Payer: COMMERCIAL

## 2020-06-23 ENCOUNTER — PATIENT MESSAGE (OUTPATIENT)
Dept: GASTROENTEROLOGY | Facility: CLINIC | Age: 60
End: 2020-06-23

## 2020-06-23 ENCOUNTER — TELEPHONE (OUTPATIENT)
Dept: GASTROENTEROLOGY | Facility: CLINIC | Age: 60
End: 2020-06-23

## 2020-06-23 ENCOUNTER — OFFICE VISIT (OUTPATIENT)
Dept: GASTROENTEROLOGY | Facility: CLINIC | Age: 60
End: 2020-06-23
Payer: COMMERCIAL

## 2020-06-23 VITALS
HEART RATE: 70 BPM | BODY MASS INDEX: 19.78 KG/M2 | HEIGHT: 63.5 IN | WEIGHT: 113 LBS | SYSTOLIC BLOOD PRESSURE: 94 MMHG | DIASTOLIC BLOOD PRESSURE: 60 MMHG

## 2020-06-23 DIAGNOSIS — Z86.010 HISTORY OF COLONIC POLYPS: Primary | ICD-10-CM

## 2020-06-23 DIAGNOSIS — Z12.31 VISIT FOR SCREENING MAMMOGRAM: ICD-10-CM

## 2020-06-23 PROCEDURE — 77063 BREAST TOMOSYNTHESIS BI: CPT | Performed by: OBSTETRICS & GYNECOLOGY

## 2020-06-23 PROCEDURE — 99243 OFF/OP CNSLTJ NEW/EST LOW 30: CPT | Performed by: INTERNAL MEDICINE

## 2020-06-23 PROCEDURE — 77067 SCR MAMMO BI INCL CAD: CPT | Performed by: OBSTETRICS & GYNECOLOGY

## 2020-06-23 RX ORDER — POLYETHYLENE GLYCOL 3350, SODIUM CHLORIDE, SODIUM BICARBONATE, POTASSIUM CHLORIDE 420; 11.2; 5.72; 1.48 G/4L; G/4L; G/4L; G/4L
POWDER, FOR SOLUTION ORAL
Qty: 1 BOTTLE | Refills: 0 | Status: SHIPPED | OUTPATIENT
Start: 2020-06-23 | End: 2021-06-02 | Stop reason: ALTCHOICE

## 2020-06-23 RX ORDER — POLYETHYLENE GLYCOL 3350, SODIUM CHLORIDE, SODIUM BICARBONATE, POTASSIUM CHLORIDE 420; 11.2; 5.72; 1.48 G/4L; G/4L; G/4L; G/4L
POWDER, FOR SOLUTION ORAL
Qty: 1 BOTTLE | Refills: 0 | Status: SHIPPED | OUTPATIENT
Start: 2020-06-23 | End: 2020-06-23

## 2020-06-23 NOTE — TELEPHONE ENCOUNTER
Scheduled for:  Colonoscopy [address]  Provider Name:  Dr Delores Schneider  Date:   Mon 7/27/2020  Location: [facility]   Sedation: MAC   Time:  10:15 am, pt is aware that [facility] will call day before procedure with arrival time  Prep:  Split suprep/trilyte  Meds/Allergies Reconcil

## 2020-06-23 NOTE — H&P
[address] [facility] [address] Gastroenterology                                                                                                  Clinic History and Physical     Pa Crohn's Disease   • Colon Polyps Brother       Social History: Social History    Tobacco Use      Smoking status: Never Smoker      Smokeless tobacco: Never Used    Alcohol use: Yes      Comment: wine, 2 glasses weekly    Drug use: No       Medications (Ac and rhythm, the extremities are warm and well perfused   Lung- Moves air well;  No labored breathing  Abdomen- soft, non-tender exam in all quadrants without rigidity or guarding, non-distended, no abnormal bowel sounds noted, no masses are palpated  Skin- prep as directed by gastro office. May substitute with Trilyte/generic equivalent if needed. Imaging & Referrals:  None         ARON Ortiz MD  Pager: [phone]  6/23/2020

## 2020-06-23 NOTE — TELEPHONE ENCOUNTER
Spoke to [facility] in [place]/[facility] ([phone]) and reviewed to d/c RX's sent to his location as pt wants it filled in [place]. He verbalized understanding.      Two bowel preps sent per MD written order so that the pt can choose based on preference/cost which

## 2020-06-23 NOTE — PATIENT INSTRUCTIONS
1. Schedule colonoscopy with MAC [Diagnosis: screening]    2.  bowel prep from pharmacy (split Suprep OR Trilyte, pick one)    3. Continue all medications for procedure    4. Read all bowel prep instructions carefully    5.  AVOID seeds, nuts, popcor

## 2020-06-23 NOTE — TELEPHONE ENCOUNTER
Patient called in to advise that the prescription Na Sulfate-K Sulfate-Mg Sulf (SUPREP BOWEL PREP KIT) 17.5-3.13-1.6 GM/177ML Oral Solution and PEG 3350-KCl-Na Bicarb-NaCl (TRILYTE) 420 g Oral Recon Soln was sent to the wrong pharmacy.  Please send to [facility]

## 2020-06-23 NOTE — TELEPHONE ENCOUNTER
From: Emiliano Mahajan  To: Nicky Schroeder MD  Sent: 6/23/2020 4:19 PM CDT  Subject: Non-Urgent Medical Question    [facility]. Rx for colon prep mistakenly sent to wrong [facility]. Thanks!

## 2020-07-24 ENCOUNTER — LAB REQUISITION (OUTPATIENT)
Dept: LAB | Facility: HOSPITAL | Age: 60
End: 2020-07-24
Payer: COMMERCIAL

## 2020-07-24 DIAGNOSIS — Z20.828 CONTACT WITH AND (SUSPECTED) EXPOSURE TO OTHER VIRAL COMMUNICABLE DISEASES: ICD-10-CM

## 2020-07-24 LAB — SARS-COV-2 RNA RESP QL NAA+PROBE: NOT DETECTED

## 2020-07-27 ENCOUNTER — SURGERY CENTER DOCUMENTATION (OUTPATIENT)
Dept: SURGERY | Age: 60
End: 2020-07-27

## 2020-07-27 ENCOUNTER — LAB REQUISITION (OUTPATIENT)
Dept: LAB | Facility: HOSPITAL | Age: 60
End: 2020-07-27
Payer: COMMERCIAL

## 2020-07-27 DIAGNOSIS — K63.5 POLYP OF COLON, UNSPECIFIED PART OF COLON, UNSPECIFIED TYPE: ICD-10-CM

## 2020-07-27 DIAGNOSIS — Z12.11 ENCOUNTER FOR SCREENING FOR MALIGNANT NEOPLASM OF COLON: ICD-10-CM

## 2020-07-27 PROCEDURE — 45385 COLONOSCOPY W/LESION REMOVAL: CPT | Performed by: INTERNAL MEDICINE

## 2020-07-27 PROCEDURE — 88305 TISSUE EXAM BY PATHOLOGIST: CPT | Performed by: INTERNAL MEDICINE

## 2020-07-27 NOTE — PROCEDURES
COLONOSCOPY REPORT    Ashley Sims     1960 Age 62year old   PCP Lulu Palomares MD Endoscopist Greg Pineda MD     Date of procedure: 20    Location: [address]    Procedure: Colonoscopy w/cold snare in the descending colon; flat morphology; cold snare polypectomy and retrieved. 2. Diverticulosis: none. 3. Terminal ileum: the visualized mucosa appeared normal.    4. A retroflexed view of the rectum revealed small internal hemorrhoids.     5. The c

## 2020-07-29 ENCOUNTER — TELEPHONE (OUTPATIENT)
Dept: GASTROENTEROLOGY | Facility: CLINIC | Age: 60
End: 2020-07-29

## 2020-07-29 NOTE — TELEPHONE ENCOUNTER
I mailed out colonoscopy results letter to pt  Updated health maintenance  Entered into 3 yr CLN recall  Recall colon in 3 years per.  Colon done 7/27/2020    GI staff: please place recall for colonoscopy in 3 years

## 2021-04-29 RX ORDER — SIMVASTATIN 10 MG
10 TABLET ORAL EVERY EVENING
Qty: 30 TABLET | Refills: 1 | Status: SHIPPED | OUTPATIENT
Start: 2021-04-29 | End: 2021-07-08

## 2021-04-29 NOTE — TELEPHONE ENCOUNTER
Naveen Acharya needs a refill of:    • SIMVASTATIN 10 MG Oral Tab TAKE 1 TABLET BY MOUTH EVERY EVENING 90 tablet 3

## 2021-05-12 ENCOUNTER — OFFICE VISIT (OUTPATIENT)
Dept: OBGYN CLINIC | Facility: CLINIC | Age: 61
End: 2021-05-12
Payer: COMMERCIAL

## 2021-05-12 VITALS
BODY MASS INDEX: 19.42 KG/M2 | DIASTOLIC BLOOD PRESSURE: 72 MMHG | SYSTOLIC BLOOD PRESSURE: 112 MMHG | WEIGHT: 111 LBS | HEIGHT: 63.5 IN

## 2021-05-12 DIAGNOSIS — M85.80 OSTEOPENIA AFTER MENOPAUSE: ICD-10-CM

## 2021-05-12 DIAGNOSIS — Z78.0 OSTEOPENIA AFTER MENOPAUSE: ICD-10-CM

## 2021-05-12 DIAGNOSIS — Z01.419 WOMEN'S ANNUAL ROUTINE GYNECOLOGICAL EXAMINATION: Primary | ICD-10-CM

## 2021-05-12 PROCEDURE — 99396 PREV VISIT EST AGE 40-64: CPT | Performed by: OBSTETRICS & GYNECOLOGY

## 2021-05-12 PROCEDURE — 87624 HPV HI-RISK TYP POOLED RSLT: CPT | Performed by: OBSTETRICS & GYNECOLOGY

## 2021-05-12 PROCEDURE — 3008F BODY MASS INDEX DOCD: CPT | Performed by: OBSTETRICS & GYNECOLOGY

## 2021-05-12 PROCEDURE — 3078F DIAST BP <80 MM HG: CPT | Performed by: OBSTETRICS & GYNECOLOGY

## 2021-05-12 PROCEDURE — 3074F SYST BP LT 130 MM HG: CPT | Performed by: OBSTETRICS & GYNECOLOGY

## 2021-05-12 NOTE — PROGRESS NOTES
Naveen Saritas is here for a checkup. She has no gynecologic complaints. Her mammogram June of last year was normal.  I gave her a prescription for mammogram as well as bone density. Patient is current with her colonoscopy.     On examination:  [facility] Tab Take 1 tablet (10 mg total) by mouth every evening. 30 tablet 1   • PEG 3350-KCl-Na Bicarb-NaCl (TRILYTE) 420 g Oral Recon Soln Take prep as directed by gastro office. May substitute with Trilyte/generic equivalent if needed.  1 Bottle 0   • Calcium Cit Health  Financial Resource Strain:       Difficulty of Paying Living Expenses:   Food Insecurity:       Worried About [address] in the Last Year:       Ran Out of Food in the Last Year:   Transportation Needs:       Lack of Transportation (Medica non tender without edema    1. Women's annual routine gynecological examination    2. Osteopenia after menopause        Patient counseled on diet/exercise     Encounter Diagnosis and Orders   1.  Women's annual routine gynecological examination  Small cervi

## 2021-06-02 ENCOUNTER — OFFICE VISIT (OUTPATIENT)
Dept: FAMILY MEDICINE CLINIC | Facility: CLINIC | Age: 61
End: 2021-06-02
Payer: COMMERCIAL

## 2021-06-02 ENCOUNTER — LAB ENCOUNTER (OUTPATIENT)
Dept: LAB | Age: 61
End: 2021-06-02
Attending: FAMILY MEDICINE
Payer: COMMERCIAL

## 2021-06-02 VITALS
DIASTOLIC BLOOD PRESSURE: 69 MMHG | HEIGHT: 63.5 IN | HEART RATE: 61 BPM | WEIGHT: 110.81 LBS | BODY MASS INDEX: 19.39 KG/M2 | TEMPERATURE: 97 F | SYSTOLIC BLOOD PRESSURE: 107 MMHG | RESPIRATION RATE: 18 BRPM

## 2021-06-02 DIAGNOSIS — E78.5 HYPERLIPIDEMIA, UNSPECIFIED HYPERLIPIDEMIA TYPE: ICD-10-CM

## 2021-06-02 DIAGNOSIS — Z00.00 ROUTINE PHYSICAL EXAMINATION: Primary | ICD-10-CM

## 2021-06-02 DIAGNOSIS — M85.80 OSTEOPENIA, UNSPECIFIED LOCATION: ICD-10-CM

## 2021-06-02 DIAGNOSIS — B35.1 ONYCHOMYCOSIS: ICD-10-CM

## 2021-06-02 DIAGNOSIS — Z00.00 ROUTINE PHYSICAL EXAMINATION: ICD-10-CM

## 2021-06-02 DIAGNOSIS — Z85.828 HISTORY OF BASAL CELL CANCER: ICD-10-CM

## 2021-06-02 PROCEDURE — 3008F BODY MASS INDEX DOCD: CPT | Performed by: FAMILY MEDICINE

## 2021-06-02 PROCEDURE — 3078F DIAST BP <80 MM HG: CPT | Performed by: FAMILY MEDICINE

## 2021-06-02 PROCEDURE — 99396 PREV VISIT EST AGE 40-64: CPT | Performed by: FAMILY MEDICINE

## 2021-06-02 PROCEDURE — 3074F SYST BP LT 130 MM HG: CPT | Performed by: FAMILY MEDICINE

## 2021-06-02 PROCEDURE — 90471 IMMUNIZATION ADMIN: CPT | Performed by: FAMILY MEDICINE

## 2021-06-02 PROCEDURE — 90750 HZV VACC RECOMBINANT IM: CPT | Performed by: FAMILY MEDICINE

## 2021-06-02 PROCEDURE — 36415 COLL VENOUS BLD VENIPUNCTURE: CPT

## 2021-06-02 PROCEDURE — 80053 COMPREHEN METABOLIC PANEL: CPT

## 2021-06-02 PROCEDURE — 80061 LIPID PANEL: CPT

## 2021-06-02 RX ORDER — TERBINAFINE HYDROCHLORIDE 250 MG/1
250 TABLET ORAL DAILY
Qty: 42 TABLET | Refills: 0 | Status: SHIPPED | OUTPATIENT
Start: 2021-06-02 | End: 2021-07-14

## 2021-06-02 NOTE — PROGRESS NOTES
HPI/Subjective:   Patient ID: Jazmine Hodges is a 56year old female. Patient presents for routine physical.      History/Other:   Review of Systems   Constitutional: Negative. Respiratory: Negative. Cardiovascular: Negative.     Gastrointestinal: Ne will consider terbinafine as directed reviewed instructions and side effects. Check baseline liver function today.     Osteopenia, unspecified location–has order for bone density from Dr. Torres Shultz    History of basal cell cancer–using sunscreen, regular dermatolog

## 2021-06-30 ENCOUNTER — HOSPITAL ENCOUNTER (OUTPATIENT)
Dept: BONE DENSITY | Age: 61
Discharge: HOME | End: 2021-06-30
Attending: OBSTETRICS & GYNECOLOGY
Payer: COMMERCIAL

## 2021-06-30 ENCOUNTER — HOSPITAL ENCOUNTER (OUTPATIENT)
Dept: MAMMOGRAPHY | Age: 61
Discharge: HOME | End: 2021-06-30
Attending: OBSTETRICS & GYNECOLOGY
Payer: COMMERCIAL

## 2021-06-30 DIAGNOSIS — M85.80 OSTEOPENIA AFTER MENOPAUSE: ICD-10-CM

## 2021-06-30 DIAGNOSIS — Z01.419 WOMEN'S ANNUAL ROUTINE GYNECOLOGICAL EXAMINATION: ICD-10-CM

## 2021-06-30 DIAGNOSIS — Z78.0 OSTEOPENIA AFTER MENOPAUSE: ICD-10-CM

## 2021-06-30 PROCEDURE — 77067 SCR MAMMO BI INCL CAD: CPT | Performed by: OBSTETRICS & GYNECOLOGY

## 2021-06-30 PROCEDURE — 77080 DXA BONE DENSITY AXIAL: CPT | Performed by: OBSTETRICS & GYNECOLOGY

## 2021-06-30 PROCEDURE — 77063 BREAST TOMOSYNTHESIS BI: CPT | Performed by: OBSTETRICS & GYNECOLOGY

## 2021-07-08 RX ORDER — SIMVASTATIN 10 MG
10 TABLET ORAL EVERY EVENING
Qty: 90 TABLET | Refills: 3 | Status: SHIPPED | OUTPATIENT
Start: 2021-07-08 | End: 2022-06-28

## 2021-07-09 ENCOUNTER — TELEPHONE (OUTPATIENT)
Dept: FAMILY MEDICINE CLINIC | Facility: CLINIC | Age: 61
End: 2021-07-09

## 2021-07-09 NOTE — TELEPHONE ENCOUNTER
Patient is requesting script simvastatin 10 MG Oral Tab be transferred to [place] in [address] [facility].

## 2021-07-09 NOTE — TELEPHONE ENCOUNTER
Spoke with Regla at [facility], states prescription is in system and can be filled at that pharmacy. Left voicemail message informing pt prescription has been transferred as requested and to call back if any questions.

## 2022-05-23 ENCOUNTER — OFFICE VISIT (OUTPATIENT)
Dept: OBGYN CLINIC | Facility: CLINIC | Age: 62
End: 2022-05-23
Payer: COMMERCIAL

## 2022-05-23 VITALS
DIASTOLIC BLOOD PRESSURE: 72 MMHG | BODY MASS INDEX: 19.25 KG/M2 | WEIGHT: 110 LBS | HEIGHT: 63.5 IN | SYSTOLIC BLOOD PRESSURE: 114 MMHG

## 2022-05-23 DIAGNOSIS — Z12.31 ENCOUNTER FOR SCREENING MAMMOGRAM FOR MALIGNANT NEOPLASM OF BREAST: ICD-10-CM

## 2022-05-23 DIAGNOSIS — Z01.419 ENCOUNTER FOR GYNECOLOGICAL EXAMINATION WITHOUT ABNORMAL FINDING: Primary | ICD-10-CM

## 2022-05-23 PROBLEM — N76.0 VAGINITIS AND VULVOVAGINITIS: Status: RESOLVED | Noted: 2017-07-19 | Resolved: 2022-05-23

## 2022-05-23 PROBLEM — Z85.828 HISTORY OF BASAL CELL CANCER: Status: RESOLVED | Noted: 2020-06-01 | Resolved: 2022-05-23

## 2022-05-23 PROCEDURE — 87624 HPV HI-RISK TYP POOLED RSLT: CPT | Performed by: OBSTETRICS & GYNECOLOGY

## 2022-05-24 LAB — HPV I/H RISK 1 DNA SPEC QL NAA+PROBE: NEGATIVE

## 2022-06-03 ENCOUNTER — LAB ENCOUNTER (OUTPATIENT)
Dept: LAB | Age: 62
End: 2022-06-03
Attending: FAMILY MEDICINE
Payer: COMMERCIAL

## 2022-06-03 ENCOUNTER — OFFICE VISIT (OUTPATIENT)
Dept: FAMILY MEDICINE CLINIC | Facility: CLINIC | Age: 62
End: 2022-06-03
Payer: COMMERCIAL

## 2022-06-03 ENCOUNTER — PATIENT MESSAGE (OUTPATIENT)
Dept: INTERNAL MEDICINE CLINIC | Facility: CLINIC | Age: 62
End: 2022-06-03

## 2022-06-03 VITALS
SYSTOLIC BLOOD PRESSURE: 108 MMHG | WEIGHT: 110 LBS | HEIGHT: 63 IN | DIASTOLIC BLOOD PRESSURE: 66 MMHG | BODY MASS INDEX: 19.49 KG/M2 | TEMPERATURE: 98 F | HEART RATE: 55 BPM | OXYGEN SATURATION: 98 %

## 2022-06-03 DIAGNOSIS — Z00.00 ROUTINE PHYSICAL EXAMINATION: ICD-10-CM

## 2022-06-03 DIAGNOSIS — E78.5 HYPERLIPIDEMIA, UNSPECIFIED HYPERLIPIDEMIA TYPE: ICD-10-CM

## 2022-06-03 DIAGNOSIS — M85.80 OSTEOPENIA, UNSPECIFIED LOCATION: ICD-10-CM

## 2022-06-03 DIAGNOSIS — Z00.00 ROUTINE PHYSICAL EXAMINATION: Primary | ICD-10-CM

## 2022-06-03 LAB
ALBUMIN SERPL-MCNC: 4 G/DL (ref 3.4–5)
ALBUMIN/GLOB SERPL: 1.3 {RATIO} (ref 1–2)
ALP LIVER SERPL-CCNC: 46 U/L
ALT SERPL-CCNC: 29 U/L
ANION GAP SERPL CALC-SCNC: 5 MMOL/L (ref 0–18)
AST SERPL-CCNC: 20 U/L (ref 15–37)
BILIRUB SERPL-MCNC: 0.5 MG/DL (ref 0.1–2)
BUN BLD-MCNC: 14 MG/DL (ref 7–18)
BUN/CREAT SERPL: 20 (ref 10–20)
CALCIUM BLD-MCNC: 9.2 MG/DL (ref 8.5–10.1)
CHLORIDE SERPL-SCNC: 107 MMOL/L (ref 98–112)
CHOLEST SERPL-MCNC: 180 MG/DL (ref ?–200)
CO2 SERPL-SCNC: 28 MMOL/L (ref 21–32)
CREAT BLD-MCNC: 0.7 MG/DL
FASTING PATIENT LIPID ANSWER: YES
FASTING STATUS PATIENT QL REPORTED: YES
GLOBULIN PLAS-MCNC: 3 G/DL (ref 2.8–4.4)
GLUCOSE BLD-MCNC: 97 MG/DL (ref 70–99)
HDLC SERPL-MCNC: 70 MG/DL (ref 40–59)
LDLC SERPL CALC-MCNC: 94 MG/DL (ref ?–100)
NONHDLC SERPL-MCNC: 110 MG/DL (ref ?–130)
OSMOLALITY SERPL CALC.SUM OF ELEC: 290 MOSM/KG (ref 275–295)
POTASSIUM SERPL-SCNC: 4.1 MMOL/L (ref 3.5–5.1)
PROT SERPL-MCNC: 7 G/DL (ref 6.4–8.2)
SODIUM SERPL-SCNC: 140 MMOL/L (ref 136–145)
TRIGL SERPL-MCNC: 87 MG/DL (ref 30–149)
VIT D+METAB SERPL-MCNC: 48.4 NG/ML (ref 30–100)
VLDLC SERPL CALC-MCNC: 14 MG/DL (ref 0–30)

## 2022-06-03 PROCEDURE — 80053 COMPREHEN METABOLIC PANEL: CPT

## 2022-06-03 PROCEDURE — 36415 COLL VENOUS BLD VENIPUNCTURE: CPT

## 2022-06-03 PROCEDURE — 3074F SYST BP LT 130 MM HG: CPT | Performed by: FAMILY MEDICINE

## 2022-06-03 PROCEDURE — 80061 LIPID PANEL: CPT

## 2022-06-03 PROCEDURE — 99396 PREV VISIT EST AGE 40-64: CPT | Performed by: FAMILY MEDICINE

## 2022-06-03 PROCEDURE — 3008F BODY MASS INDEX DOCD: CPT | Performed by: FAMILY MEDICINE

## 2022-06-03 PROCEDURE — 82306 VITAMIN D 25 HYDROXY: CPT

## 2022-06-03 PROCEDURE — 3078F DIAST BP <80 MM HG: CPT | Performed by: FAMILY MEDICINE

## 2022-06-06 ENCOUNTER — NURSE ONLY (OUTPATIENT)
Dept: FAMILY MEDICINE CLINIC | Facility: CLINIC | Age: 62
End: 2022-06-06
Payer: COMMERCIAL

## 2022-06-06 DIAGNOSIS — Z23 NEED FOR SHINGLES VACCINE: Primary | ICD-10-CM

## 2022-06-06 PROCEDURE — 90471 IMMUNIZATION ADMIN: CPT | Performed by: FAMILY MEDICINE

## 2022-06-06 PROCEDURE — 90750 HZV VACC RECOMBINANT IM: CPT | Performed by: FAMILY MEDICINE

## 2022-06-06 NOTE — PROGRESS NOTES
Received first shingles vaccine in left deltoid. Informed to return in 2-6 months for second dose. Informed of side effects and no other questions.

## 2022-06-22 NOTE — TELEPHONE ENCOUNTER
Spoke to pt in regards to reminder of  Mammo order. Pt stated that she will be [place] of town the rest of June and would schedule for sometime in July when she returns. Pt requested [facility] address and phone number which was given over the phone pt also requested to have sent to My chart for record. Pt had no further questions or concerns.

## 2022-06-28 RX ORDER — SIMVASTATIN 10 MG
TABLET ORAL
Qty: 90 TABLET | Refills: 3 | Status: SHIPPED | OUTPATIENT
Start: 2022-06-28

## 2022-07-27 ENCOUNTER — HOSPITAL ENCOUNTER (OUTPATIENT)
Dept: MAMMOGRAPHY | Age: 62
Discharge: HOME | End: 2022-07-27
Attending: OBSTETRICS & GYNECOLOGY
Payer: COMMERCIAL

## 2022-07-27 DIAGNOSIS — Z12.31 ENCOUNTER FOR SCREENING MAMMOGRAM FOR MALIGNANT NEOPLASM OF BREAST: ICD-10-CM

## 2022-07-27 PROCEDURE — 77067 SCR MAMMO BI INCL CAD: CPT | Performed by: OBSTETRICS & GYNECOLOGY

## 2022-07-27 PROCEDURE — 77063 BREAST TOMOSYNTHESIS BI: CPT | Performed by: OBSTETRICS & GYNECOLOGY

## 2023-03-31 ENCOUNTER — TELEPHONE (OUTPATIENT)
Facility: CLINIC | Age: 63
End: 2023-03-31

## 2023-03-31 NOTE — TELEPHONE ENCOUNTER
Patient outreach message received:    Entered into 3 yr CLN recall  Recall colon in 3 years per. Colon done 7/27/2020    Recall reminder letter mailed out to patient.

## 2023-06-06 ENCOUNTER — OFFICE VISIT (OUTPATIENT)
Dept: FAMILY MEDICINE CLINIC | Facility: CLINIC | Age: 63
End: 2023-06-06

## 2023-06-06 ENCOUNTER — TELEPHONE (OUTPATIENT)
Facility: CLINIC | Age: 63
End: 2023-06-06

## 2023-06-06 ENCOUNTER — LAB ENCOUNTER (OUTPATIENT)
Dept: LAB | Age: 63
End: 2023-06-06
Attending: FAMILY MEDICINE
Payer: COMMERCIAL

## 2023-06-06 VITALS
HEART RATE: 58 BPM | DIASTOLIC BLOOD PRESSURE: 77 MMHG | TEMPERATURE: 97 F | BODY MASS INDEX: 19.69 KG/M2 | SYSTOLIC BLOOD PRESSURE: 121 MMHG | HEIGHT: 62.99 IN | WEIGHT: 111.13 LBS

## 2023-06-06 DIAGNOSIS — Z00.00 ENCOUNTER FOR ANNUAL PHYSICAL EXAM: ICD-10-CM

## 2023-06-06 DIAGNOSIS — Z86.010 HISTORY OF COLON POLYPS: Primary | ICD-10-CM

## 2023-06-06 DIAGNOSIS — Z12.31 BREAST CANCER SCREENING BY MAMMOGRAM: ICD-10-CM

## 2023-06-06 DIAGNOSIS — Z12.12 ENCOUNTER FOR COLORECTAL CANCER SCREENING: ICD-10-CM

## 2023-06-06 DIAGNOSIS — E78.5 HYPERLIPIDEMIA, UNSPECIFIED HYPERLIPIDEMIA TYPE: ICD-10-CM

## 2023-06-06 DIAGNOSIS — Z00.00 ENCOUNTER FOR ANNUAL PHYSICAL EXAM: Primary | ICD-10-CM

## 2023-06-06 DIAGNOSIS — M85.80 OSTEOPENIA, UNSPECIFIED LOCATION: ICD-10-CM

## 2023-06-06 DIAGNOSIS — Z12.11 ENCOUNTER FOR COLORECTAL CANCER SCREENING: ICD-10-CM

## 2023-06-06 DIAGNOSIS — Z85.828 HISTORY OF BASAL CELL CANCER: ICD-10-CM

## 2023-06-06 DIAGNOSIS — H91.90 HEARING LOSS, UNSPECIFIED HEARING LOSS TYPE, UNSPECIFIED LATERALITY: ICD-10-CM

## 2023-06-06 DIAGNOSIS — Z86.010 HISTORY OF COLONIC POLYPS: ICD-10-CM

## 2023-06-06 LAB
ALBUMIN SERPL-MCNC: 3.8 G/DL (ref 3.4–5)
ALBUMIN/GLOB SERPL: 1.4 {RATIO} (ref 1–2)
ALP LIVER SERPL-CCNC: 44 U/L
ALT SERPL-CCNC: 36 U/L
ANION GAP SERPL CALC-SCNC: 2 MMOL/L (ref 0–18)
AST SERPL-CCNC: 23 U/L (ref 15–37)
BILIRUB SERPL-MCNC: 0.5 MG/DL (ref 0.1–2)
BUN BLD-MCNC: 16 MG/DL (ref 7–18)
BUN/CREAT SERPL: 27.1 (ref 10–20)
CALCIUM BLD-MCNC: 9 MG/DL (ref 8.5–10.1)
CHLORIDE SERPL-SCNC: 108 MMOL/L (ref 98–112)
CHOLEST SERPL-MCNC: 183 MG/DL (ref ?–200)
CO2 SERPL-SCNC: 29 MMOL/L (ref 21–32)
CREAT BLD-MCNC: 0.59 MG/DL
DEPRECATED RDW RBC AUTO: 41.5 FL (ref 35.1–46.3)
ERYTHROCYTE [DISTWIDTH] IN BLOOD BY AUTOMATED COUNT: 12.1 % (ref 11–15)
FASTING PATIENT LIPID ANSWER: YES
FASTING STATUS PATIENT QL REPORTED: YES
GFR SERPLBLD BASED ON 1.73 SQ M-ARVRAT: 101 ML/MIN/1.73M2 (ref 60–?)
GLOBULIN PLAS-MCNC: 2.8 G/DL (ref 2.8–4.4)
GLUCOSE BLD-MCNC: 116 MG/DL (ref 70–99)
HCT VFR BLD AUTO: 40.7 %
HDLC SERPL-MCNC: 80 MG/DL (ref 40–59)
HGB BLD-MCNC: 13.2 G/DL
LDLC SERPL CALC-MCNC: 89 MG/DL (ref ?–100)
MCH RBC QN AUTO: 30.4 PG (ref 26–34)
MCHC RBC AUTO-ENTMCNC: 32.4 G/DL (ref 31–37)
MCV RBC AUTO: 93.8 FL
NONHDLC SERPL-MCNC: 103 MG/DL (ref ?–130)
OSMOLALITY SERPL CALC.SUM OF ELEC: 290 MOSM/KG (ref 275–295)
PLATELET # BLD AUTO: 193 10(3)UL (ref 150–450)
POTASSIUM SERPL-SCNC: 4.2 MMOL/L (ref 3.5–5.1)
PROT SERPL-MCNC: 6.6 G/DL (ref 6.4–8.2)
RBC # BLD AUTO: 4.34 X10(6)UL
SODIUM SERPL-SCNC: 139 MMOL/L (ref 136–145)
TRIGL SERPL-MCNC: 79 MG/DL (ref 30–149)
VLDLC SERPL CALC-MCNC: 13 MG/DL (ref 0–30)
WBC # BLD AUTO: 5.1 X10(3) UL (ref 4–11)

## 2023-06-06 PROCEDURE — 99396 PREV VISIT EST AGE 40-64: CPT | Performed by: FAMILY MEDICINE

## 2023-06-06 PROCEDURE — 80053 COMPREHEN METABOLIC PANEL: CPT

## 2023-06-06 PROCEDURE — 3008F BODY MASS INDEX DOCD: CPT | Performed by: FAMILY MEDICINE

## 2023-06-06 PROCEDURE — 90471 IMMUNIZATION ADMIN: CPT | Performed by: FAMILY MEDICINE

## 2023-06-06 PROCEDURE — 36415 COLL VENOUS BLD VENIPUNCTURE: CPT

## 2023-06-06 PROCEDURE — 85027 COMPLETE CBC AUTOMATED: CPT

## 2023-06-06 PROCEDURE — 3074F SYST BP LT 130 MM HG: CPT | Performed by: FAMILY MEDICINE

## 2023-06-06 PROCEDURE — 90715 TDAP VACCINE 7 YRS/> IM: CPT | Performed by: FAMILY MEDICINE

## 2023-06-06 PROCEDURE — 3078F DIAST BP <80 MM HG: CPT | Performed by: FAMILY MEDICINE

## 2023-06-06 PROCEDURE — 80061 LIPID PANEL: CPT

## 2023-06-07 RX ORDER — POLYETHYLENE GLYCOL 3350, SODIUM CHLORIDE, SODIUM BICARBONATE, POTASSIUM CHLORIDE 420; 11.2; 5.72; 1.48 G/4L; G/4L; G/4L; G/4L
POWDER, FOR SOLUTION ORAL
Qty: 4000 ML | Refills: 0 | Status: SHIPPED | OUTPATIENT
Start: 2023-06-07

## 2023-06-07 NOTE — TELEPHONE ENCOUNTER
Scheduling:  cln w/ carmelita w/ Dr. Vinayak Hankins  Dx: colon polyps  trilyte split dose sent e-scribe

## 2023-06-07 NOTE — TELEPHONE ENCOUNTER
Last Procedure, Date, MD:    Last Diagnosis: Colonoscopy, 7/27/20, Dr. Sadnra Dale   Recalled (mth/yrs): 3 years  Sedation Used Previously: MAC   Last Prep Used (if known): n/a  Quality Of Prep (if known): good   Anticoagulants: n/a  Diuretics: n/a  Diabetic Med's (PO/Injectables): n/a  Weight loss Med's: n/a  Iron/Herbal/Multivitamin Supplements (RX/OTC): n/a  Marijuana/Vaping/CBD: n/a  Height & Weight: 5'3\"/110lb  BMI: 19.49  Hx of Cardiac/CVA Issues/(MI/Stroke): n/a  Devices Pacemaker/Defibrillator/Stents: n/a  Respiratory Issues/Oxygen Use/COLLETTE/COPD: n/a  Issues w/ Anesthesia: n/a    Symptoms (Y/N): N  Symptoms Details: n/a     Special Comments/Notes: verified allergies, medications, pharmacy. Patient out of town until June 19th, please call after that to schedule. Please advise on orders and prep. Thank you!

## 2023-06-23 NOTE — TELEPHONE ENCOUNTER
Scheduled for:  Colonoscopy [id number], [address] [facility]  Provider Name:  Dennise Barton  Date:  10/2/2023  Location:  [facility]  Sedation:  MAC  Time:  8:00am, (pt is aware that Judith Seals will call the day before to confirm arrival time)  Prep:  Trilyte  Meds/Allergies Reconciled?: Lidia/NP reviewed. Diagnosis with codes: History of Colon Polyps Z86.010   Was patient informed to call insurance with codes (Y/N): Yes   Referral sent?:  Yes  [facility] or [facility] notified?: Electronic case request was sent to Judith Seals via [employer]. Medication Orders: Pt is aware to NOT take iron pills, herbal meds and diet supplements for 7 days before exam. Also to NOT take any form of alcohol, recreational drugs and any forms of ED meds 24 hours before exam.    Misc Orders: N/A      Further instructions given by staff:I discussed the prep instructions with the patient which she verbally understood and is aware that I will send prep instructions via [employer] today.

## 2023-07-10 RX ORDER — SIMVASTATIN 10 MG
10 TABLET ORAL EVERY EVENING
Qty: 90 TABLET | Refills: 3 | Status: SHIPPED | OUTPATIENT
Start: 2023-07-10

## 2023-07-10 NOTE — TELEPHONE ENCOUNTER
Emeli from [facility] in [place], [place] calling to request Simvastatin refill for patient. Medication pended. Routing through protocol.

## 2023-07-11 NOTE — TELEPHONE ENCOUNTER
Higinio active, message sent for preferred pharmacy clarification ([address] ) prior for  approval.     Refill passed per [employer], [facility] protocol. Requested Prescriptions   Pending Prescriptions Disp Refills    simvastatin 10 MG Oral Tab 90 tablet 3     Sig: Take 1 tablet (10 mg total) by mouth every evening.        Cholesterol Medication Protocol Passed - 7/10/2023  8:19 PM        Passed - ALT in past 12 months        Passed - LDL in past 12 months        Passed - Last ALT < 80     Lab Results   Component Value Date    ALT 36 06/06/2023             Passed - Last LDL < 130     Lab Results   Component Value Date    LDL 89 06/06/2023             Passed - In person appointment or virtual visit in the past 12 mos or appointment in next 3 mos     Recent Outpatient Visits              1 month ago [facility] for [facility] exam    [facility]    Office Visit    1 year ago Need for shingles vaccine    [facility], [place] [address], [facility]    Nurse Only    1 year ago Routine physical examination    [address] [facility]    Office Visit    1 year ago Encounter for gynecological examination without abnormal finding    [address] [facility], [address] [facility] Carlos Singer MD    Office Visit    2 years ago Routine physical examination    [address], [place] [address], [facility] Rodney Paul MD    Office Visit          Future Appointments         Provider Department Appt Notes    In 3 weeks ADO GALLO RM1; ADO DEXA [address]     In 3 weeks ADO GALLO [address] [facility] [address]     In 3 weeks Shaylee Puckett [address], [address], [place] [facility]    In 2 months [address], [address], [place] Colonoscopy w/MAC @[facility]                     Future Appointments         Provider Department Appt Notes    In 3 weeks ADO GALLO RM1; ADO DEXA [address]     In 3 weeks ADO GALLO [address] [facility]     In 3 weeks Shaylee Hernandez [facility], [address], [place] [facility]    In 2 months [employer], [address], [address], [place] Colonoscopy w/MAC @[facility]             Recent Outpatient Visits              1 month ago Encounter for annual physical exam    Fabien Coburn MD    Office Visit    1 year ago Need for shingles vaccine    [facility], [address], [facility]    Nurse Only    1 year ago Routine physical examination    Fabien Coburn MD    Office Visit    1 year ago Encounter for gynecological examination without abnormal finding    [address], Luan Pardo MD    Office Visit    2 years ago Routine physical examination    [address], [address], [facility] Filiberto Bryan MD    Office Visit

## 2023-07-31 ENCOUNTER — HOSPITAL ENCOUNTER (OUTPATIENT)
Dept: MAMMOGRAPHY | Age: 63
Discharge: HOME | End: 2023-07-31
Attending: FAMILY MEDICINE
Payer: COMMERCIAL

## 2023-07-31 ENCOUNTER — HOSPITAL ENCOUNTER (OUTPATIENT)
Dept: BONE DENSITY | Age: 63
Discharge: HOME | End: 2023-07-31
Attending: FAMILY MEDICINE
Payer: COMMERCIAL

## 2023-07-31 DIAGNOSIS — Z12.31 BREAST CANCER SCREENING BY MAMMOGRAM: ICD-10-CM

## 2023-07-31 DIAGNOSIS — M85.80 OSTEOPENIA, UNSPECIFIED LOCATION: ICD-10-CM

## 2023-07-31 PROCEDURE — 77067 SCR MAMMO BI INCL CAD: CPT | Performed by: FAMILY MEDICINE

## 2023-07-31 PROCEDURE — 77080 DXA BONE DENSITY AXIAL: CPT | Performed by: FAMILY MEDICINE

## 2023-07-31 PROCEDURE — 77063 BREAST TOMOSYNTHESIS BI: CPT | Performed by: FAMILY MEDICINE

## 2023-08-03 ENCOUNTER — OFFICE VISIT (OUTPATIENT)
Dept: AUDIOLOGY | Facility: CLINIC | Age: 63
End: 2023-08-03

## 2023-08-03 DIAGNOSIS — H90.3 SENSORINEURAL HEARING LOSS, BILATERAL: Primary | ICD-10-CM

## 2023-08-03 PROCEDURE — 92567 TYMPANOMETRY: CPT | Performed by: AUDIOLOGIST

## 2023-08-03 PROCEDURE — 92557 COMPREHENSIVE HEARING TEST: CPT | Performed by: AUDIOLOGIST

## 2023-10-02 PROBLEM — K63.5 POLYP OF ASCENDING COLON: Status: ACTIVE | Noted: 2023-10-02

## 2023-10-02 PROCEDURE — 88305 TISSUE EXAM BY PATHOLOGIST: CPT | Performed by: INTERNAL MEDICINE

## 2023-10-12 ENCOUNTER — TELEPHONE (OUTPATIENT)
Dept: GASTROENTEROLOGY | Facility: CLINIC | Age: 63
End: 2023-10-12

## 2023-10-12 NOTE — TELEPHONE ENCOUNTER
I mailed out colonoscopy results letter to pt  Updated health maintenance  Entered into 5 yr CLN recall  Recall colon in 5 years per.  Colon done 10/02/2023    Patti Maloney MD  P Em Gi Clinical Staff  GI staff: please place recall for colonoscopy in 5 years

## 2024-06-27 ENCOUNTER — OFFICE VISIT (OUTPATIENT)
Dept: FAMILY MEDICINE CLINIC | Facility: CLINIC | Age: 64
End: 2024-06-27

## 2024-06-27 ENCOUNTER — LAB ENCOUNTER (OUTPATIENT)
Dept: LAB | Age: 64
End: 2024-06-27
Attending: FAMILY MEDICINE
Payer: COMMERCIAL

## 2024-06-27 VITALS
HEIGHT: 62.99 IN | WEIGHT: 109 LBS | DIASTOLIC BLOOD PRESSURE: 70 MMHG | BODY MASS INDEX: 19.31 KG/M2 | TEMPERATURE: 97 F | OXYGEN SATURATION: 97 % | HEART RATE: 63 BPM | SYSTOLIC BLOOD PRESSURE: 104 MMHG

## 2024-06-27 DIAGNOSIS — E78.5 HYPERLIPIDEMIA, UNSPECIFIED HYPERLIPIDEMIA TYPE: ICD-10-CM

## 2024-06-27 DIAGNOSIS — M85.80 OSTEOPENIA, UNSPECIFIED LOCATION: ICD-10-CM

## 2024-06-27 DIAGNOSIS — E55.9 VITAMIN D DEFICIENCY: ICD-10-CM

## 2024-06-27 DIAGNOSIS — Z00.00 ROUTINE PHYSICAL EXAMINATION: Primary | ICD-10-CM

## 2024-06-27 DIAGNOSIS — Z12.31 VISIT FOR SCREENING MAMMOGRAM: ICD-10-CM

## 2024-06-27 DIAGNOSIS — Z00.00 ROUTINE PHYSICAL EXAMINATION: ICD-10-CM

## 2024-06-27 PROBLEM — R92.30 DENSE BREASTS: Status: RESOLVED | Noted: 2018-03-08 | Resolved: 2024-06-27

## 2024-06-27 LAB
ALBUMIN SERPL-MCNC: 4.8 G/DL (ref 3.2–4.8)
ALBUMIN/GLOB SERPL: 2.3 {RATIO} (ref 1–2)
ALP LIVER SERPL-CCNC: 51 U/L
ALT SERPL-CCNC: 20 U/L
ANION GAP SERPL CALC-SCNC: 4 MMOL/L (ref 0–18)
AST SERPL-CCNC: 21 U/L (ref ?–34)
BILIRUB SERPL-MCNC: 1 MG/DL (ref 0.2–1.1)
BUN BLD-MCNC: 12 MG/DL (ref 9–23)
BUN/CREAT SERPL: 15.2 (ref 10–20)
CALCIUM BLD-MCNC: 9.6 MG/DL (ref 8.7–10.4)
CHLORIDE SERPL-SCNC: 109 MMOL/L (ref 98–112)
CHOLEST SERPL-MCNC: 208 MG/DL (ref ?–200)
CO2 SERPL-SCNC: 30 MMOL/L (ref 21–32)
CREAT BLD-MCNC: 0.79 MG/DL
DEPRECATED RDW RBC AUTO: 40.7 FL (ref 35.1–46.3)
EGFRCR SERPLBLD CKD-EPI 2021: 83 ML/MIN/1.73M2 (ref 60–?)
ERYTHROCYTE [DISTWIDTH] IN BLOOD BY AUTOMATED COUNT: 11.9 % (ref 11–15)
FASTING PATIENT LIPID ANSWER: YES
FASTING STATUS PATIENT QL REPORTED: YES
GLOBULIN PLAS-MCNC: 2.1 G/DL (ref 2–3.5)
GLUCOSE BLD-MCNC: 88 MG/DL (ref 70–99)
HCT VFR BLD AUTO: 42.7 %
HDLC SERPL-MCNC: 72 MG/DL (ref 40–59)
HGB BLD-MCNC: 14.4 G/DL
LDLC SERPL CALC-MCNC: 122 MG/DL (ref ?–100)
MCH RBC QN AUTO: 31.4 PG (ref 26–34)
MCHC RBC AUTO-ENTMCNC: 33.7 G/DL (ref 31–37)
MCV RBC AUTO: 93 FL
NONHDLC SERPL-MCNC: 136 MG/DL (ref ?–130)
OSMOLALITY SERPL CALC.SUM OF ELEC: 295 MOSM/KG (ref 275–295)
PLATELET # BLD AUTO: 200 10(3)UL (ref 150–450)
POTASSIUM SERPL-SCNC: 4.2 MMOL/L (ref 3.5–5.1)
PROT SERPL-MCNC: 6.9 G/DL (ref 5.7–8.2)
RBC # BLD AUTO: 4.59 X10(6)UL
SODIUM SERPL-SCNC: 143 MMOL/L (ref 136–145)
TRIGL SERPL-MCNC: 80 MG/DL (ref 30–149)
VIT D+METAB SERPL-MCNC: 58.7 NG/ML (ref 30–100)
VLDLC SERPL CALC-MCNC: 14 MG/DL (ref 0–30)
WBC # BLD AUTO: 4.2 X10(3) UL (ref 4–11)

## 2024-06-27 PROCEDURE — 3074F SYST BP LT 130 MM HG: CPT | Performed by: FAMILY MEDICINE

## 2024-06-27 PROCEDURE — 80053 COMPREHEN METABOLIC PANEL: CPT

## 2024-06-27 PROCEDURE — 82306 VITAMIN D 25 HYDROXY: CPT

## 2024-06-27 PROCEDURE — 90677 PCV20 VACCINE IM: CPT | Performed by: FAMILY MEDICINE

## 2024-06-27 PROCEDURE — 3008F BODY MASS INDEX DOCD: CPT | Performed by: FAMILY MEDICINE

## 2024-06-27 PROCEDURE — 90471 IMMUNIZATION ADMIN: CPT | Performed by: FAMILY MEDICINE

## 2024-06-27 PROCEDURE — 85027 COMPLETE CBC AUTOMATED: CPT

## 2024-06-27 PROCEDURE — 3078F DIAST BP <80 MM HG: CPT | Performed by: FAMILY MEDICINE

## 2024-06-27 PROCEDURE — 36415 COLL VENOUS BLD VENIPUNCTURE: CPT

## 2024-06-27 PROCEDURE — 80061 LIPID PANEL: CPT

## 2024-06-27 PROCEDURE — 99397 PER PM REEVAL EST PAT 65+ YR: CPT | Performed by: FAMILY MEDICINE

## 2024-06-27 NOTE — PROGRESS NOTES
Subjective:   Patient ID: Amanda Estevez is a 63 year old female.    Patient presents for routine physical.  Generally feeling well.        History/Other:   Review of Systems   Constitutional: Negative.    Respiratory: Negative.     Cardiovascular: Negative.    Gastrointestinal: Negative.    Skin: Negative.    Neurological: Negative.      Current Outpatient Medications   Medication Sig Dispense Refill    simvastatin 10 MG Oral Tab Take 1 tablet (10 mg total) by mouth every evening. 90 tablet 3    Calcium Citrate-Vitamin D (CALCIUM CITRATE+D3 PETITES OR)       Cholecalciferol (VITAMIN D3) 50 MCG (2000 UT) Oral Tab        Allergies:  Allergies   Allergen Reactions    Bactrim [Sulfamethoxazole W/Trimethoprim]     Sulfanilamide RASH       Objective:   Physical Exam  Constitutional:       Appearance: Normal appearance.   Cardiovascular:      Rate and Rhythm: Normal rate and regular rhythm.      Heart sounds: Normal heart sounds.   Pulmonary:      Effort: Pulmonary effort is normal.      Breath sounds: Normal breath sounds.   Chest:   Breasts:     Right: Normal. No mass.      Left: Normal. No mass.   Abdominal:      Palpations: Abdomen is soft. There is no mass.      Tenderness: There is no abdominal tenderness.   Lymphadenopathy:      Cervical: No cervical adenopathy.      Upper Body:      Right upper body: No axillary adenopathy.      Left upper body: No axillary adenopathy.   Skin:     General: Skin is warm and dry.   Neurological:      Mental Status: She is alert and oriented to person, place, and time.         Assessment & Plan:   1. Routine physical examination-patient is , adult children, grandchildren.  Exercising regularly with walking, yoga.  Fasting labs done today  PCV 20 given  Colon cancer screening up-to-date  Pap smears with gynecology  History of basal cell cancer, sees dermatology regularly.   2. Visit for screening mammogram-order given   3. Hyperlipidemia, unspecified hyperlipidemia  type-continuing simvastatin 10 mg daily.  Check labs today    4. Osteopenia, unspecified location-continue weightbearing exercise, vitamin D, calcium in diet and supplement.  Recheck DEXA scan next year.   5. Vitamin D deficiency-taking vitamin D 4000 IU daily.  Check labs today.       Orders Placed This Encounter   Procedures    Lipid Panel [E]    CBC, Platelet, No Differential [E]    Comp Metabolic Panel (14) [E]    Vitamin D [E]    Prevnar 20 (PCV20) [76653]       Meds This Visit:  Requested Prescriptions      No prescriptions requested or ordered in this encounter       Imaging & Referrals:  PCV20 VACCINE FOR INTRAMUSCULAR USE  [facility] ASHLY 2D+3D SCREENING BILAT (CPT=77067/82988)

## 2024-07-09 NOTE — TELEPHONE ENCOUNTER
simvastatin 10 MG Oral Tab, Take 1 tablet (10 mg total) by mouth every evening., Disp: 90 tablet, Rfl: 3

## 2024-07-12 RX ORDER — SIMVASTATIN 10 MG
10 TABLET ORAL EVERY EVENING
Qty: 90 TABLET | Refills: 3 | Status: SHIPPED | OUTPATIENT
Start: 2024-07-12

## 2024-07-12 RX ORDER — SIMVASTATIN 10 MG
10 TABLET ORAL EVERY EVENING
Qty: 90 TABLET | Refills: 3 | OUTPATIENT
Start: 2024-07-12

## 2024-07-12 NOTE — TELEPHONE ENCOUNTER
REFILL PASSED PER [facility] PROTOCOLS    Requested Prescriptions   Pending Prescriptions Disp Refills    simvastatin 10 MG Oral Tab 90 tablet 3     Sig: Take 1 tablet (10 mg total) by mouth every evening.       Cholesterol Medication Protocol Passed - 7/9/2024 11:25 AM        Passed - ALT < 80     Lab Results   Component Value Date    ALT 20 06/27/2024             Passed - ALT resulted within past year        Passed - Lipid panel within past 12 months     Lab Results   Component Value Date    CHOLEST 208 (H) 06/27/2024    TRIG 80 06/27/2024    HDL 72 (H) 06/27/2024     (H) 06/27/2024    VLDL 14 06/27/2024    NONHDLC 136 (H) 06/27/2024             Passed - In person appointment or virtual visit in the past 12 mos or appointment in next 3 mos     Recent Outpatient Visits              2 weeks ago Routine physical examination    [facility] Lulu Padgett MD    Office Visit    11 months ago Sensorineural hearing loss, bilateral    [facility] Barb Robison Au.D    Office Visit    1 year ago Encounter for annual physical exam    [facility] Lulu Brown MD    Office Visit    2 years ago Need for shingles vaccine    [facility]    Nurse Only    2 years ago Routine physical examination    [facility] Lulu Padgett MD    Office Visit          Future Appointments         Provider Department Appt Notes    In 3 weeks [address]                          Future Appointments         Provider Department Appt Notes    In 3 weeks [address]           Recent Outpatient Visits              2 weeks ago Routine physical examination    [facility] Lulu Padgett MD    Office Visit    11  months ago Sensorineural hearing loss, bilateral    [facility]    Office Visit    1 year ago Encounter for annual physical exam    [facility] Lulu Padgett MD    Office Visit    2 years ago Need for shingles vaccine    [facility]    Nurse Only    2 years ago Routine physical examination    [facility] Lulu Padgett MD    Office Visit

## 2024-09-09 ENCOUNTER — OFFICE VISIT (OUTPATIENT)
Facility: LOCATION | Age: 64
End: 2024-09-09
Payer: COMMERCIAL

## 2024-09-09 ENCOUNTER — HOSPITAL ENCOUNTER (OUTPATIENT)
Dept: MAMMOGRAPHY | Age: 64
Discharge: HOME | End: 2024-09-09
Attending: FAMILY MEDICINE
Payer: COMMERCIAL

## 2024-09-09 DIAGNOSIS — H90.3 SENSORINEURAL HEARING LOSS, BILATERAL: ICD-10-CM

## 2024-09-09 DIAGNOSIS — H90.3 SENSORINEURAL HEARING LOSS (SNHL), BILATERAL: Primary | ICD-10-CM

## 2024-09-09 DIAGNOSIS — Z12.31 VISIT FOR SCREENING MAMMOGRAM: ICD-10-CM

## 2024-09-09 PROCEDURE — 92567 TYMPANOMETRY: CPT | Performed by: AUDIOLOGIST

## 2024-09-09 PROCEDURE — 77063 BREAST TOMOSYNTHESIS BI: CPT | Performed by: FAMILY MEDICINE

## 2024-09-09 PROCEDURE — 92557 COMPREHENSIVE HEARING TEST: CPT | Performed by: AUDIOLOGIST

## 2024-09-09 PROCEDURE — 77067 SCR MAMMO BI INCL CAD: CPT | Performed by: FAMILY MEDICINE

## 2024-09-17 ENCOUNTER — OFFICE VISIT (OUTPATIENT)
Dept: FAMILY MEDICINE CLINIC | Facility: CLINIC | Age: 64
End: 2024-09-17
Payer: COMMERCIAL

## 2024-09-17 VITALS
DIASTOLIC BLOOD PRESSURE: 66 MMHG | HEIGHT: 63 IN | HEART RATE: 58 BPM | TEMPERATURE: 98 F | WEIGHT: 115 LBS | BODY MASS INDEX: 20.37 KG/M2 | SYSTOLIC BLOOD PRESSURE: 110 MMHG | OXYGEN SATURATION: 98 %

## 2024-09-17 DIAGNOSIS — H00.016 HORDEOLUM EXTERNUM OF LEFT EYE, UNSPECIFIED EYELID: Primary | ICD-10-CM

## 2024-09-17 PROCEDURE — 99213 OFFICE O/P EST LOW 20 MIN: CPT

## 2024-09-17 PROCEDURE — 3074F SYST BP LT 130 MM HG: CPT

## 2024-09-17 PROCEDURE — 3008F BODY MASS INDEX DOCD: CPT

## 2024-09-17 PROCEDURE — 3078F DIAST BP <80 MM HG: CPT

## 2024-09-17 RX ORDER — ERYTHROMYCIN 5 MG/G
1 OINTMENT OPHTHALMIC 2 TIMES DAILY
Qty: 3.5 G | Refills: 0 | Status: SHIPPED | OUTPATIENT
Start: 2024-09-17

## 2024-09-17 NOTE — PROGRESS NOTES
Amanda Estevez is a 63 year old female.  Chief Complaint   Patient presents with    Eye Visual Problem     Stye to left eye     HPI:   Amanda Estevez presented to the clinic for stye to the left lower eyelid.  Associated erythema, drainage has been applying warm compresses with minimal improvement.       Current Outpatient Medications   Medication Sig Dispense Refill    simvastatin 10 MG Oral Tab Take 1 tablet (10 mg total) by mouth every evening. 90 tablet 3    Calcium Citrate-Vitamin D (CALCIUM CITRATE+D3 PETITES OR)       Cholecalciferol (VITAMIN D3) 50 MCG (2000 UT) Oral Tab         Past Medical History:    Basal cell carcinoma    nose (removed with MOHS procedure)    Colon polyps    repeat CLN in 5 years    Dense breasts    Heterogenous      High cholesterol    Hyperlipidemia    Osteopenia    osteopenia, L hip 3/11, recheck in 4 yr      Past Surgical History:   Procedure Laterality Date    Appendectomy  1970    Breast biopsy      x 2,     Colonoscopy N/A 10/2/2023    Procedure: COLONOSCOPY;  Surgeon: SENA Singletary MD;  [facility]    Colonoscopy & polypectomy  2006, 2010,  2020    Colonoscopy with polypectomy    Knee surgery Right 2017    meniscal tear    Leep  2002    for maryana 2    Javier localization wire 1 site left (cpt=19281) Left 77198's    lipoma x2    Mohs - referral  2019    removal of basal cell cancinoma of the nose      Social History:  Social History     Socioeconomic History    Marital status:    Occupational History    Occupation: Reired     Comment: taught dietetics   Tobacco Use    Smoking status: Never    Smokeless tobacco: Never   Vaping Use    Vaping status: Never Used   Substance and Sexual Activity    Alcohol use: Yes     Comment: wine, 2 glasses weekly    Drug use: No    Sexual activity: Yes     Partners: Male   Other Topics Concern    Blood Transfusions No    Caffeine Concern Yes     Comment: tea, 1 cup daily   Social History Narrative    Live with        Family  History   Problem Relation Age of Onset    Skin cancer Self     Heart Attack Mother 48        MI    Heart Disease Mother 48        CABG    Hypertension Father         HTN    Heart Disease Father         CAD    Diabetes Father         DM    Colon Polyps Sister     Crohn's Disease Brother     Colon Polyps Brother     Heart Attack Maternal Grandmother 65    Heart Attack Maternal Grandfather 40    Stroke Paternal Grandmother 50    Stroke Paternal Grandfather     Breast Cancer Paternal Aunt         70's    Pancreatic Cancer Paternal Cousin Female 75    Breast Cancer Paternal Cousin Female         in her 50's    Ovarian Cancer Neg     Endometrial Cancer Neg     Colon Cancer Neg       Allergies   Allergen Reactions    Bactrim [Sulfamethoxazole W/Trimethoprim]     Sulfanilamide RASH        REVIEW OF SYSTEMS:   Review of Systems   Constitutional:  Negative for activity change.   Eyes:  Negative for photophobia, pain, discharge, redness, itching and visual disturbance.   Respiratory:  Negative for chest tightness and shortness of breath.    Cardiovascular:  Negative for chest pain and palpitations.   Neurological: Negative.    Psychiatric/Behavioral: Negative.     All other systems reviewed and are negative.     Wt Readings from Last 5 Encounters:   09/17/24 115 lb (52.2 kg)   06/27/24 109 lb (49.4 kg)   09/26/23 112 lb (50.8 kg)   06/06/23 111 lb 2 oz (50.4 kg)   06/03/22 110 lb (49.9 kg)     Body mass index is 20.37 kg/m².      EXAM:   /66 (BP Location: Right arm, Patient Position: Sitting, Cuff Size: adult)   Pulse 58   Temp 98.1 °F (36.7 °C) (Temporal)   Ht 5' 3\" (1.6 m)   Wt 115 lb (52.2 kg)   SpO2 98%   BMI 20.37 kg/m²   Physical Exam  Vitals reviewed.   Constitutional:       Appearance: Normal appearance.   HENT:      Head: Normocephalic and atraumatic.   Eyes:      General:         Left eye: Hordeolum present.     Conjunctiva/sclera:      Right eye: Right conjunctiva is not injected. No exudate or  hemorrhage.     Left eye: Left conjunctiva is not injected. No exudate or hemorrhage.    Pulmonary:      Effort: Pulmonary effort is normal.   Neurological:      General: No focal deficit present.      Mental Status: She is alert and oriented to person, place, and time.   Psychiatric:         Mood and Affect: Mood normal.         Behavior: Behavior normal.            ASSESSMENT AND PLAN:   (H00.016) Hordeolum externum of left eye, unspecified eyelid  (primary encounter diagnosis)  Plan: erythromycin 5 MG/GM Ophthalmic Ointment        Patient with stye to left lower eyelid.  Advised warm compresses (minimum 6/day).  Can try baby soap.  Gentle massage.  Prescription for erythromycin to pharmacy.  If no improvement follow-up with ophthalmology.      Follow up as needed     The patient indicates understanding of these issues and agrees to the plan.  Chaperone offered to the patient prior to examination    This note was prepared using Dragon Medical voice recognition dictation software. As a result errors may occur. When identified these errors have been corrected. While every attempt is made to correct errors during dictation discrepancies may still exist.

## 2024-10-04 ENCOUNTER — OFFICE VISIT (OUTPATIENT)
Dept: AUDIOLOGY | Facility: CLINIC | Age: 64
End: 2024-10-04
Payer: COMMERCIAL

## 2024-10-04 DIAGNOSIS — H90.3 SENSORINEURAL HEARING LOSS (SNHL) OF BOTH EARS: Primary | ICD-10-CM

## 2024-10-04 NOTE — PROGRESS NOTES
HEARING AID EVALUATION    Amanda Estevez is a 63 year old female     Referring Provider: N/A  YOB: 1960  Medical Record: [id number]    Date of hearing assessment: 9/9/2024 (Hearing test valid for 365 days for the purpose of purchasing a hearing aid)    Test Results:   See audiogram for air and bone conduction thresholds and recorded speech in quiet.    Quick SIN Speech in Noise Test presented binaurally through insert earphones yielded score of  5.5 SNR loss  The following table shows scoring for this test and gives an indication of how well this person would be expected to hear in a background of noise.    0 - 3 dB SNR-Loss              Normal  4 - 7 dB SNR-Loss              Mild  8 - 15 dB SNR Loss           Moderate  >15 dB SNR Loss               Severe    Hearing Handicap Inventory Screening Version (HHIE-S)  Total Score= 10  (0-8 no handicap, 10-24 mild-mod handicap, 26-40 severe handicap)    Hearing Aid Selection:    Based on the audiometric test results, patient perception of hearing difficulty, and patient lifestyle the following hearing aid(s) and features are recommended as medically necessary to improve the patient's ability to hear speech sounds in a variety of listening environments.   The use of hearing aids will enhance the patient's quality of life and improve the ability to maintain social contacts with work, family or friends.      Type/Style of hearing aid recommended:   RADHA ( in canal with open    Recommended Features of hearing aid:    Rechargeable    Bluetooth Connectivity  Noise reduction to improve speech perception in noisy environments  Dual Microphones to improve hearing speech from different directions  Active Feedback management  Frequency Lowering Capabilities to improve speech perception with severe high-frequency hearing loss  Echoblock/Windblock technology to reduce yovani noise and reverberation in certain environments  Tinnitus Masking capabilities  Automatic  steering between listening environments.       Hearing aid charges and policies were discussed with patient including warranty information, trial period with amplification, benefits/limitations of current technology.  Realistic expectations and acclimating to new sounds was also discussed.        Hearing Aid Order:   : Oticon  Model: Intent 2 miniRITE-Rs  Color Choice: chestunut   length/power right: 2 85   length/ power left: 2 85  Technology Level Choice: option 2    Pt believes she has hearing aid benefit of $2500 total; will confirm. Hearing aid benefits inquiry sent. Results of this request will be sent to patient's MyChart in 4-6 weeks.     Audiologist:  Eddy Sears  Audiology [place] License Number: [id number]

## 2024-11-26 ENCOUNTER — OFFICE VISIT (OUTPATIENT)
Dept: AUDIOLOGY | Facility: CLINIC | Age: 64
End: 2024-11-26
Payer: COMMERCIAL

## 2024-11-26 DIAGNOSIS — H90.3 SENSORINEURAL HEARING LOSS (SNHL) OF BOTH EARS: Primary | ICD-10-CM

## 2024-11-26 PROCEDURE — V5261 HEARING AID, DIGIT, BIN, BTE: HCPCS | Performed by: AUDIOLOGIST

## 2024-11-26 NOTE — PROGRESS NOTES
Amanda Estevez                                                          Right:  Left:    Make/model/color:   Oticon Intent 2 miniRITE-R  Oticon Intent 2 miniRITE-R   Serial number:   [id number]  [id number]   Repair/L&D:  11/26/2027 11/26/2027   :  2 85 2 85   Dome: 6mm open bass  6mm open bass   Filter:  Prowax minifit Prowax minifit   Battery:  rechargeable  rechargeable        :   Smart : [id number]         EE Service exp:   01/10/2025  01/10/2025   Dispensing date:   11/26/2024 11/26/024       Amanda Estevez, 63 year old, was seen today for HAid dispensing.  Good cosmetics.  REM completed.  Pt declined HAid adjustments; stated HAids sound clear and comfortable.  Counseled pt re acclimation to amplification for new users; pt verbalized understanding.    Pt demonstrated ability to properly and fully insert & remove HAid, charge battery, clean HAid, and manipulate button (on/off).  Verbalized understanding of use/care precautions.  To review process of filter and dome change at next visit.    Assisted pt in established BT connection to HAids, downloaded & demonstrated use of Oticon carol.  Successfully streamed music & accepted phone call in office following setup.     Purchase agreement completed.  Balance billed to insurance.  HAid check appt scheduled for 12/17/2024.  Pt to call with any questions/concerns that arise prior to that time.

## 2024-12-17 ENCOUNTER — OFFICE VISIT (OUTPATIENT)
Dept: AUDIOLOGY | Facility: CLINIC | Age: 64
End: 2024-12-17
Payer: COMMERCIAL

## 2024-12-17 DIAGNOSIS — H90.3 SENSORINEURAL HEARING LOSS (SNHL) OF BOTH EARS: Primary | ICD-10-CM

## 2024-12-17 PROCEDURE — 92593 HEARING AID CHECK, BOTH EARS: CPT | Performed by: AUDIOLOGIST

## 2024-12-17 NOTE — PROGRESS NOTES
Amanda Estevez                                                          Right:  Left:    Make/model/color:   Oticon Intent 2 miniRITE-R  Oticon Intent 2 miniRITE-R   Serial number:   [id number]  [id number]   Repair/L&D:  11/26/2027 11/26/2027   :  2 85 2 85   Dome: 6mm open bass  6mm open bass   Filter:  Prowax minifit Prowax minifit   Battery:  rechargeable  rechargeable        :   Smart : [id number]         EE Service exp:   05/26/2025 05/26/2025   Dispensing date:   11/26/2024 11/26/024     Amanda Estevez, 64 year old, was seen today for her 1st HAid check since dispensing.  Ms. Estevez reported good communication benefits noting less need for repetition of auditory messages.  She indicated full time use without any discomfort or concerns regarding sound quality.    Physical inspection and listening check revealed appropriate function and gain without any signs of difficulty in cleaning/caring for HAid.  Otoscopics revealed patent canals without any indication of irritation from HAid use.  Data logging revealed appropriate use patterns with minimal use of program button.  Decreased overall gain 1 click for both aids per pt request.      Provided instruction re: changing domes & filters. Pt demonstrated ability to complete task in office with minimal difficulty.     Pt pleased with sound quality & benefit & wishes to keep HAids following trial.  Recommended recheck in 6 months, sooner with any issues/concerns that arise; scheduled on 05/20/2025.

## 2025-05-19 ENCOUNTER — OFFICE VISIT (OUTPATIENT)
Dept: AUDIOLOGY | Facility: CLINIC | Age: 65
End: 2025-05-19
Payer: COMMERCIAL

## 2025-05-19 DIAGNOSIS — H90.3 SENSORINEURAL HEARING LOSS (SNHL) OF BOTH EARS: Primary | ICD-10-CM

## 2025-05-19 PROCEDURE — 92593 HEARING AID CHECK, BOTH EARS: CPT | Performed by: AUDIOLOGIST

## 2025-05-19 NOTE — PROGRESS NOTES
Amanda Estevez                                                          Right:  Left:    Make/model/color:   Oticon Intent 2 miniRITE-R  Oticon Intent 2 miniRITE-R   Serial number:   [id number]  [id number]   Repair/L&D:  11/26/2027 11/26/2027   :  2 85 2 85   Dome: 6mm open bass  6mm open bass   Filter:  Prowax minifit Prowax minifit   Battery:  rechargeable  rechargeable        :   Smart : [id number]         EE Service exp:   05/26/2025 05/26/2025   Dispensing date:   11/26/2024 11/26/024     Amanda Estevez was seen today for a HAid check reporting no issues.     Otoscopics clear AU.  Cleaned & suctioned HAids well.  Replaced filters & domes.  Listening check revealed appropriate function, gain and sound quality.  Pt pleased with HAid sound quality with no further concerns at this time.    Reviewed cleaning techniques, including dome & filter change. Pt demonstrated ability to complete task in office without difficulty.     Pt to call with any further issues/concerns that arise.  Recommended recheck in 1 year.

## 2025-05-28 ENCOUNTER — NURSE TRIAGE (OUTPATIENT)
Dept: FAMILY MEDICINE CLINIC | Facility: CLINIC | Age: 65
End: 2025-05-28

## 2025-05-28 NOTE — TELEPHONE ENCOUNTER
Action Requested: Summary for Provider     []  Critical Lab, Recommendations Needed  [] Need Additional Advice  []   FYI    []   Need Orders  [] Need Medications Sent to Pharmacy  []  Other     SUMMARY: Home care. Advised to call if symptoms worsen or do not improve, She verbalized understanding.     Reason for call: Rash Skin Problem  Onset: Data Unavailable    [place] tick. She picked it off of her leg on Friday. She does have a circular red adryan. It is about a half inch. It has a distinct red border. She denies other symptoms.     Reason for Disposition   Tick bite with no complications    Protocols used: Tick Bite-A-OH

## 2025-07-01 ENCOUNTER — OFFICE VISIT (OUTPATIENT)
Dept: FAMILY MEDICINE CLINIC | Facility: CLINIC | Age: 65
End: 2025-07-01
Payer: COMMERCIAL

## 2025-07-01 VITALS
BODY MASS INDEX: 19.67 KG/M2 | DIASTOLIC BLOOD PRESSURE: 66 MMHG | OXYGEN SATURATION: 96 % | WEIGHT: 111 LBS | HEIGHT: 63 IN | SYSTOLIC BLOOD PRESSURE: 133 MMHG | HEART RATE: 55 BPM

## 2025-07-01 DIAGNOSIS — S80.861A TICK BITE OF RIGHT LOWER LEG, INITIAL ENCOUNTER: ICD-10-CM

## 2025-07-01 DIAGNOSIS — Z12.31 VISIT FOR SCREENING MAMMOGRAM: ICD-10-CM

## 2025-07-01 DIAGNOSIS — Z00.00 ROUTINE PHYSICAL EXAMINATION: ICD-10-CM

## 2025-07-01 DIAGNOSIS — Z01.419 ENCOUNTER FOR GYNECOLOGICAL EXAMINATION WITHOUT ABNORMAL FINDING: Primary | ICD-10-CM
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
## 2025-07-01 DIAGNOSIS — Z78.0 ASYMPTOMATIC MENOPAUSAL STATE: ICD-10-CM

## 2025-07-01 DIAGNOSIS — E78.5 HYPERLIPIDEMIA, UNSPECIFIED HYPERLIPIDEMIA TYPE: ICD-10-CM

## 2025-07-01 DIAGNOSIS — M85.80 OSTEOPENIA, UNSPECIFIED LOCATION: ICD-10-CM

## 2025-07-01 LAB
ALBUMIN SERPL-MCNC: 4.9 G/DL (ref 3.2–4.8)
ALBUMIN/GLOB SERPL: 2.7 {RATIO} (ref 1–2)
ALP LIVER SERPL-CCNC: 49 U/L (ref 55–142)
ALT SERPL-CCNC: 28 U/L (ref 10–49)
ANION GAP SERPL CALC-SCNC: 7 MMOL/L (ref 0–18)
AST SERPL-CCNC: 26 U/L (ref ?–34)
BILIRUB SERPL-MCNC: 0.9 MG/DL (ref 0.2–1.1)
BUN BLD-MCNC: 14 MG/DL (ref 9–23)
BUN/CREAT SERPL: 19.7 (ref 10–20)
CALCIUM BLD-MCNC: 9.3 MG/DL (ref 8.7–10.4)
CHLORIDE SERPL-SCNC: 104 MMOL/L (ref 98–112)
CHOLEST SERPL-MCNC: 228 MG/DL (ref ?–200)
CO2 SERPL-SCNC: 28 MMOL/L (ref 21–32)
CREAT BLD-MCNC: 0.71 MG/DL (ref 0.55–1.02)
DEPRECATED RDW RBC AUTO: 42.5 FL (ref 35.1–46.3)
EGFRCR SERPLBLD CKD-EPI 2021: 94 ML/MIN/1.73M2 (ref 60–?)
ERYTHROCYTE [DISTWIDTH] IN BLOOD BY AUTOMATED COUNT: 12.2 % (ref 11–15)
FASTING PATIENT LIPID ANSWER: YES
FASTING STATUS PATIENT QL REPORTED: YES
GLOBULIN PLAS-MCNC: 1.8 G/DL (ref 2–3.5)
GLUCOSE BLD-MCNC: 79 MG/DL (ref 70–99)
HCT VFR BLD AUTO: 42.4 % (ref 35–48)
HDLC SERPL-MCNC: 85 MG/DL (ref 40–59)
HGB BLD-MCNC: 13.8 G/DL (ref 12–16)
LDLC SERPL CALC-MCNC: 130 MG/DL (ref ?–100)
MCH RBC QN AUTO: 30.9 PG (ref 26–34)
MCHC RBC AUTO-ENTMCNC: 32.5 G/DL (ref 31–37)
MCV RBC AUTO: 94.9 FL (ref 80–100)
NONHDLC SERPL-MCNC: 143 MG/DL (ref ?–130)
OSMOLALITY SERPL CALC.SUM OF ELEC: 287 MOSM/KG (ref 275–295)
PLATELET # BLD AUTO: 223 10(3)UL (ref 150–450)
POTASSIUM SERPL-SCNC: 4.1 MMOL/L (ref 3.5–5.1)
PROT SERPL-MCNC: 6.7 G/DL (ref 5.7–8.2)
RBC # BLD AUTO: 4.47 X10(6)UL (ref 3.8–5.3)
SODIUM SERPL-SCNC: 139 MMOL/L (ref 136–145)
TRIGL SERPL-MCNC: 75 MG/DL (ref 30–149)
VLDLC SERPL CALC-MCNC: 13 MG/DL (ref 0–30)
WBC # BLD AUTO: 5.3 X10(3) UL (ref 4–11)

## 2025-07-01 PROCEDURE — 80053 COMPREHEN METABOLIC PANEL: CPT | Performed by: FAMILY MEDICINE

## 2025-07-01 PROCEDURE — 86618 LYME DISEASE ANTIBODY: CPT | Performed by: FAMILY MEDICINE

## 2025-07-01 PROCEDURE — 80061 LIPID PANEL: CPT | Performed by: FAMILY MEDICINE

## 2025-07-01 PROCEDURE — 85027 COMPLETE CBC AUTOMATED: CPT | Performed by: FAMILY MEDICINE

## 2025-07-01 PROCEDURE — 87624 HPV HI-RISK TYP POOLED RSLT: CPT | Performed by: FAMILY MEDICINE

## 2025-07-01 NOTE — PROGRESS NOTES
The following individual(s) verbally consented to be recorded using ambient AI listening technology and understand that they can each withdraw their consent to this listening technology at any point by asking the clinician to turn off or pause the recording:    Patient name: Amanda Estevez  Additional names:

## 2025-07-02 ENCOUNTER — PATIENT MESSAGE (OUTPATIENT)
Dept: FAMILY MEDICINE CLINIC | Facility: CLINIC | Age: 65
End: 2025-07-02

## 2025-07-02 LAB — B BURGDOR IGG+IGM SER QL: NEGATIVE

## 2025-07-02 NOTE — PROGRESS NOTES
Subjective:   Amanda Estevez is a 64 year old female who presents for Physical (Annual Physical. ) and Bite (Pt had a tic bite on the lower right leg. This happened on May 25th. )       History/Other:   History of Present Illness  Amanda Estevez is a 64 year old female who presents for a routine physical exam.    She experienced a recent tick bite over Memorial Day weekend, which appeared full two days later. She did not develop a bullseye rash and felt unwell for one day but currently feels fine. No fever or significant fatigue. She denies any new symptoms related to the tick bite.    She is taking simvastatin and has reduced her vitamin D intake to 1000 IU daily. Her diet is rich in calcium, including milk, greens, and broccoli. She is due for a DEXA scan, as her last one was two years ago. She has not experienced any fractures and feels she is maintaining her bone health.    She had a colonoscopy in 2023 and is scheduled to repeat it in five years. She has a history of basal cell cancer and is keeping up with dermatology appointments and sun protection measures.    She consumes alcohol two to three times a week, typically one serving. She is active, playing golf, walking, and doing yoga regularly. 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 She is planning a river cruise with golf excursions.    She reports a history of a LEEP procedure over 20 years ago and has not had a Pap smear in the last few years.      Chief Complaint Reviewed and Verified  Nursing Notes Reviewed and   Verified  Tobacco Reviewed  Allergies Reviewed  Medications Reviewed    Problem List Reviewed  OB Status Reviewed         Tobacco:  She has never smoked tobacco.    Current Medications[1]           Review of Systems:  See above      Objective:   /66   Pulse 55   Ht 5' 3\" (1.6 m)   Wt 111 lb (50.3 kg)   SpO2 96%   BMI 19.66 kg/m²    Estimated body mass index is 19.66 kg/m² as  calculated from the following:    Height as of this encounter: 5' 3\" (1.6 m).    Weight as of this encounter: 111 lb (50.3 kg).  Results  RADIOLOGY  No results found.       Physical Exam  Physical Exam  VITALS: BP- 133/66  GENERAL: Alert, well developed, well nourished, no acute distress.  NECK: Neck supple, no masses.  CHEST: Clear to auscultation bilaterally.  CARDIOVASCULAR: Regular rate and rhythm, S1 and S2 normal, no murmurs.  BREAST: Breasts symmetrical, no masses.  ABDOMEN: Soft, non-tender, no masses.  EXTREMITIES: No edema, dorsalis pedis pulses 2+ bilaterally.      Assessment & Plan:   1. Encounter for gynecological examination without abnormal finding (Primary)  -     ThinPrep PAP with HPV Reflex Request B; Future; Expected date: 07/01/2025  -     ThinPrep PAP with HPV Reflex Request B  2. Routine physical examination  -     Lipid Panel; Future; Expected date: 07/01/2025  -     CBC, Platelet; No Differential; Future; Expected date: 07/01/2025  -     Comp Metabolic Panel (14); Future; Expected date: 07/01/2025  -     Lipid Panel  -     CBC, Platelet; No Differential  -     Comp Metabolic Panel (14)  3. Visit for screening mammogram  -     [facility] ASHLY 2D+3D SCREENING BILAT (CPT=77067/92644); Future; Expected date: 09/09/2025  4. Hyperlipidemia, unspecified hyperlipidemia type  -     Lipid Panel; Future; Expected date: 07/01/2025  -     Lipid Panel  5. Osteopenia, unspecified location  Overview:  Osteopenia left hip 3/2011, 4/2016. 1/2019 6/2021, 6/2023 recheck in 2 yrs  Orders:  -     XR DEXA BONE DENSITOMETRY (CPT=77080); Future; Expected date: 07/01/2025  6. Tick bite of right lower leg, initial encounter  -     Lyme Disease, Total Ab W Rflx; Future; Expected date: 07/01/2025  -     Lyme Disease, Total Ab W Rflx  7. Asymptomatic menopausal state  -     XR DEXA BONE DENSITOMETRY (CPT=77080); Future; Expected date: 07/01/2025      Assessment & Plan  Assessment & Plan  Tick bite  No erythema migrans or Lyme  disease symptoms 21 days post-bite. Discussed doxycycline versus serologic testing. Opted for serologic testing first.  - Order Lyme disease serology.  - Treat with antibiotics only if serology is positive.    Osteopenia  Managed with calcium and vitamin D. Re-evaluation needed for bone density.  - Order DEXA scan.    Hyperlipidemia  Managed with simvastatin 10 mg daily.    Vitamin D deficiency  Managed with cholecalciferol. Previous levels satisfactory; dose reduced to 1000 IU daily.    General Health Maintenance  Engages in regular exercise. Diet rich in calcium and vitamin D. Sun protection in place. Colonoscopy in 2023; repeat in five years. Discussed Pap smear frequency post-LEEP.  - Perform Pap smear.  - Continue sun protection measures.    Follow-up  Scheduled for blood work; fasting completed. Planning a river cruise with golf excursions.  - Follow up with blood test results.            Return in about 1 year (around 7/1/2026) for Routine physical.      Lulu Padgett MD              [1]   Current Outpatient Medications   Medication Sig Dispense Refill    simvastatin 10 MG Oral Tab Take 1 tablet (10 mg total) by mouth every evening. 90 tablet 3    Calcium Citrate-Vitamin D (CALCIUM CITRATE+D3 PETITES OR)       Cholecalciferol (VITAMIN D3) 50 MCG (2000 UT) Oral Tab

## 2025-07-03 RX ORDER — SIMVASTATIN 20 MG
20 TABLET ORAL NIGHTLY
Qty: 90 TABLET | Refills: 3 | Status: SHIPPED | OUTPATIENT
Start: 2025-07-03

## 2025-07-07 LAB — HPV E6+E7 MRNA CVX QL NAA+PROBE: NEGATIVE

## 2025-07-08 LAB — LAST PAP RESULT: NORMAL

## (undated) NOTE — MR AVS SNAPSHOT
After Visit Summary   3/10/2020    Shimon Paez    MRN: [id number]           Visit Information     Date & Time  3/10/2020  9:00 AM Provider  Rigoberto Mcdonald, [address] [facility], [facility] Dept.  Phone  [phone] between 7:30am to 6pm and on Saturday between 8am and 1pm. Online scheduling is available at [email or web address]. Evening and weekend appointments for your exam   are available.      It is the patient's responsibility to check with and follow their in a treatment plan within a few hours.  ONLINE VISIT  Primary Care Providers  Treatment for mild illness or injury that does not require immediate attention VIDEO VISITS  Average cost  $35*    e-VISTS  Average cost  $35*     SAME DAY APPOINTMENTS   Available

## (undated) NOTE — MR AVS SNAPSHOT
After Visit Summary   5/12/2021    Khari Bean    MRN: [id number]           Visit Information     Date & Time  5/12/2021 10:00 AM Provider  Ramon Calico, [address] [facility], [facility] Dept.  Phone  [phone] Imaging:   GALLO SCREENING BILAT ([id number])    Instructions:  To schedule a test at any [facility], call Central Scheduling at [phone], Monday through Friday between 7:30am to 6pm and on Saturday between 8am and 1pm. Online contact the Patient Business Office at [phone] if you have   any questions related to insurance coverage. Thank you. Children: Children under the age of 15 must have another adult caregiver with them.  Please do not bring your child/children withou [facility]  Monday – Friday  8:00 am – 8:00 pm   Saturday – Sunday  8:00 am – 4:00 pm    WALK-IN CARE  Primary Care Providers  Treatment for acute illness   or injury that are   non-life-threatening.   Also available by appoin

## (undated) NOTE — MR AVS SNAPSHOT
[facility]  [address], [address] [place] [address]  [phone]               Thank you for choosing us for your health care visit with Milo Webber.  Nahomy Lua MD.  We are glad to serve you and happy to provide you with this summary Assoc Dx:  Encounter for screening mammogram for breast cancer [Z12.31]           Vitamin D, 25-Hydroxy [E]    Complete by:  Feb 20, 2017 (Approximate)    Assoc Dx:  Osteopenia, unspecified location [M85.80]                 Follow-up Instructions     [employer] questions? Call [phone] for help. [employer] is NOT to be used for urgent needs. For medical emergencies, dial 911.            Visit [facility] online at  [email or web address]

## (undated) NOTE — LETTER
3/31/2023    [address]            Dear Kristin Whitney,      Our records indicate that you are due for an appointment for a Colonoscopy with Bishop Jacinto MD. Our doctors are booking out about 3-5 months in advance for procedures. Please call our office to schedule a phone screening appointment to plan for the procedure(s). Your medical well-being is important to us. If your insurance requires a referral, please call your primary care office to request one.       Thank you,      The Physicians and Staff at [facility]

## (undated) NOTE — Clinical Note
04/03/2017    To: Sergey Bryant    Re: Test Results      Thank you for your recent visit to our office. We have received your test results, which were done on   03/30/2017.      PAP Test:    Your results are normal.        HPV (Human papillomavirus) Test (rec

## (undated) NOTE — MR AVS SNAPSHOT
After Visit Summary   3/30/2017    Iraida Saucedo    MRN: [id number]           Visit Information        Provider Department Dept Phone    3/30/2017  9:30 AM Estela Junior MD [address]  [phone]      Your Vitals Were     BP Ht Wt BMI       1